# Patient Record
Sex: FEMALE | Race: WHITE | NOT HISPANIC OR LATINO | Employment: FULL TIME | URBAN - METROPOLITAN AREA
[De-identification: names, ages, dates, MRNs, and addresses within clinical notes are randomized per-mention and may not be internally consistent; named-entity substitution may affect disease eponyms.]

---

## 2017-02-07 PROBLEM — Z86.69 HISTORY OF EPILEPSY: Status: ACTIVE | Noted: 2017-02-07

## 2017-05-03 ENCOUNTER — HOSPITAL ENCOUNTER (OUTPATIENT)
Dept: LAB | Facility: MEDICAL CENTER | Age: 37
End: 2017-05-03
Attending: NURSE PRACTITIONER
Payer: MEDICAID

## 2017-05-03 ENCOUNTER — OFFICE VISIT (OUTPATIENT)
Dept: NEUROLOGY | Facility: MEDICAL CENTER | Age: 37
End: 2017-05-03
Payer: MEDICAID

## 2017-05-03 VITALS
WEIGHT: 156 LBS | OXYGEN SATURATION: 99 % | SYSTOLIC BLOOD PRESSURE: 122 MMHG | HEART RATE: 90 BPM | HEIGHT: 66 IN | BODY MASS INDEX: 25.07 KG/M2 | TEMPERATURE: 97.2 F | DIASTOLIC BLOOD PRESSURE: 64 MMHG

## 2017-05-03 DIAGNOSIS — G40.909 SEIZURE DISORDER (HCC): ICD-10-CM

## 2017-05-03 DIAGNOSIS — F41.9 ANXIETY: ICD-10-CM

## 2017-05-03 LAB
ALBUMIN SERPL BCP-MCNC: 4.6 G/DL (ref 3.2–4.9)
ALBUMIN/GLOB SERPL: 1.8 G/DL
ALP SERPL-CCNC: 48 U/L (ref 30–99)
ALT SERPL-CCNC: 15 U/L (ref 2–50)
ANION GAP SERPL CALC-SCNC: 6 MMOL/L (ref 0–11.9)
AST SERPL-CCNC: 16 U/L (ref 12–45)
BASOPHILS # BLD AUTO: 0.8 % (ref 0–1.8)
BASOPHILS # BLD: 0.05 K/UL (ref 0–0.12)
BILIRUB SERPL-MCNC: 0.5 MG/DL (ref 0.1–1.5)
BUN SERPL-MCNC: 10 MG/DL (ref 8–22)
CALCIUM SERPL-MCNC: 9.5 MG/DL (ref 8.5–10.5)
CHLORIDE SERPL-SCNC: 109 MMOL/L (ref 96–112)
CHOLEST SERPL-MCNC: 134 MG/DL (ref 100–199)
CO2 SERPL-SCNC: 25 MMOL/L (ref 20–33)
CREAT SERPL-MCNC: 0.67 MG/DL (ref 0.5–1.4)
EOSINOPHIL # BLD AUTO: 0.07 K/UL (ref 0–0.51)
EOSINOPHIL NFR BLD: 1.1 % (ref 0–6.9)
ERYTHROCYTE [DISTWIDTH] IN BLOOD BY AUTOMATED COUNT: 42.8 FL (ref 35.9–50)
GFR SERPL CREATININE-BSD FRML MDRD: >60 ML/MIN/1.73 M 2
GLOBULIN SER CALC-MCNC: 2.5 G/DL (ref 1.9–3.5)
GLUCOSE SERPL-MCNC: 96 MG/DL (ref 65–99)
HCT VFR BLD AUTO: 41.9 % (ref 37–47)
HDLC SERPL-MCNC: 46 MG/DL
HGB BLD-MCNC: 13.9 G/DL (ref 12–16)
IMM GRANULOCYTES # BLD AUTO: 0.02 K/UL (ref 0–0.11)
IMM GRANULOCYTES NFR BLD AUTO: 0.3 % (ref 0–0.9)
LDLC SERPL CALC-MCNC: 77 MG/DL
LYMPHOCYTES # BLD AUTO: 2.62 K/UL (ref 1–4.8)
LYMPHOCYTES NFR BLD: 42.7 % (ref 22–41)
MCH RBC QN AUTO: 31.5 PG (ref 27–33)
MCHC RBC AUTO-ENTMCNC: 33.2 G/DL (ref 33.6–35)
MCV RBC AUTO: 95 FL (ref 81.4–97.8)
MONOCYTES # BLD AUTO: 0.38 K/UL (ref 0–0.85)
MONOCYTES NFR BLD AUTO: 6.2 % (ref 0–13.4)
NEUTROPHILS # BLD AUTO: 3 K/UL (ref 2–7.15)
NEUTROPHILS NFR BLD: 48.9 % (ref 44–72)
NRBC # BLD AUTO: 0 K/UL
NRBC BLD AUTO-RTO: 0 /100 WBC
PLATELET # BLD AUTO: 182 K/UL (ref 164–446)
PMV BLD AUTO: 12 FL (ref 9–12.9)
POTASSIUM SERPL-SCNC: 3.9 MMOL/L (ref 3.6–5.5)
PROT SERPL-MCNC: 7.1 G/DL (ref 6–8.2)
RBC # BLD AUTO: 4.41 M/UL (ref 4.2–5.4)
SODIUM SERPL-SCNC: 140 MMOL/L (ref 135–145)
T4 FREE SERPL-MCNC: 1.01 NG/DL (ref 0.53–1.43)
TRIGL SERPL-MCNC: 56 MG/DL (ref 0–149)
WBC # BLD AUTO: 6.1 K/UL (ref 4.8–10.8)

## 2017-05-03 PROCEDURE — 84443 ASSAY THYROID STIM HORMONE: CPT

## 2017-05-03 PROCEDURE — 85025 COMPLETE CBC W/AUTO DIFF WBC: CPT

## 2017-05-03 PROCEDURE — 82306 VITAMIN D 25 HYDROXY: CPT

## 2017-05-03 PROCEDURE — 80061 LIPID PANEL: CPT

## 2017-05-03 PROCEDURE — 80053 COMPREHEN METABOLIC PANEL: CPT

## 2017-05-03 PROCEDURE — 82607 VITAMIN B-12: CPT

## 2017-05-03 PROCEDURE — 36415 COLL VENOUS BLD VENIPUNCTURE: CPT

## 2017-05-03 PROCEDURE — 84439 ASSAY OF FREE THYROXINE: CPT

## 2017-05-03 PROCEDURE — 99204 OFFICE O/P NEW MOD 45 MIN: CPT | Performed by: NURSE PRACTITIONER

## 2017-05-03 PROCEDURE — 80177 DRUG SCRN QUAN LEVETIRACETAM: CPT

## 2017-05-03 RX ORDER — LEVETIRACETAM 500 MG/1
1000 TABLET ORAL 2 TIMES DAILY
Qty: 120 TAB | Refills: 5 | Status: SHIPPED | OUTPATIENT
Start: 2017-05-03 | End: 2017-11-06 | Stop reason: SDUPTHER

## 2017-05-03 RX ORDER — LORAZEPAM 1 MG/1
TABLET ORAL
Qty: 20 TAB | Refills: 0 | Status: SHIPPED | OUTPATIENT
Start: 2017-05-03 | End: 2019-01-04

## 2017-05-03 RX ORDER — LEVETIRACETAM 500 MG/1
1000 TABLET ORAL 2 TIMES DAILY
COMMUNITY
End: 2017-05-03 | Stop reason: SDUPTHER

## 2017-05-03 ASSESSMENT — ENCOUNTER SYMPTOMS
DOUBLE VISION: 0
HEADACHES: 0
COUGH: 0
MUSCULOSKELETAL NEGATIVE: 1
VOMITING: 0
DIARRHEA: 0
SEIZURES: 0
ABDOMINAL PAIN: 0
SORE THROAT: 0
DEPRESSION: 0
DIZZINESS: 0
NAUSEA: 0
CONSTITUTIONAL NEGATIVE: 1
NERVOUS/ANXIOUS: 1

## 2017-05-03 NOTE — MR AVS SNAPSHOT
"Roxana Johnson   5/3/2017 8:00 AM   Office Visit   MRN: [id number]    Department:  [facility]   Dept Phone:  [phone]    Description:  Female : 1980   Provider:  ALYSSA Keating           Reason for Visit     New Patient epilepsy      Allergies as of 5/3/2017     Allergen Noted Reactions    Pcn [Penicillins] 2017         You were diagnosed with     Seizure disorder (CMS-[facility])   [436448]         Vital Signs     Blood Pressure Pulse Temperature Height Weight Body Mass Index    122/64 mmHg 90 36.2 °C (97.2 °F) 1.676 m (5' 6\") 70.761 kg (156 lb) 25.19 kg/m2    Oxygen Saturation Smoking Status                99% Current Every Day Smoker          Basic Information     Date Of Birth Sex Race Ethnicity Preferred Language    1980 Female White Non- English      Your appointments     2017 10:20 AM   Follow Up Visit with ALYSSA Keating   [facility] Neurology (--)    [address] [place] [address], [address]  [facility] [id number] [phone]           You will be receiving a confirmation call a few days before your appointment from our automated call confirmation system.              Problem List              ICD-10-CM Priority Class Noted - Resolved    History of epilepsy Z86.69   2017 - Present      Health Maintenance        Date Due Completion Dates    IMM DTaP/Tdap/Td Vaccine (1 - Tdap) 10/2/2017 (Originally 10/31/1999) ---    PAP SMEAR 2018 (Done)    Override on 2015: Done (history of HPV)            Current Immunizations     Influenza TIV (IM) 10/1/2016      Below and/or attached are the medications your provider expects you to take. Review all of your home medications and newly ordered medications with your provider and/or pharmacist. Follow medication instructions as directed by your provider and/or pharmacist. Please keep your medication list with you and share with your provider. Update the information when medications are " discontinued, doses are changed, or new medications (including over-the-counter products) are added; and carry medication information at all times in the event of emergency situations     Allergies:  PCN - (reactions not documented)               Medications  Valid as of: May 03, 2017 -  8:42 AM    Generic Name Brand Name Tablet Size Instructions for use    LevETIRAcetam (Tab) KEPPRA 500 MG Take 2 Tabs by mouth 2 times a day.        LORazepam (Tab) ATIVAN 1 MG Take 1/2-1 tablet PO PRN breakthrough seizures.  Do not exceed more than 2 tablets in 24 hours unless directed otherwise by physician.        Varenicline Tartrate (Misc) CHANTIX MARILOU 0.5 MG X 11 & 1 MG X 42 Use as directed.        Varenicline Tartrate (Tab) CHANTIX 1 MG Take 1 Tab by mouth 2 times a day.        .                 Medicines prescribed today were sent to:     [facility] [address] [place] [address] - [address] [id number] [facility] [address]    Phone: [phone] Fax: [phone]    Open 24 Hours?: No      Medication refill instructions:       If your prescription bottle indicates you have medication refills left, it is not necessary to call your provider’s office. Please contact your pharmacy and they will refill your medication.    If your prescription bottle indicates you do not have any refills left, you may request refills at any time through one of the following ways: The online [employer] system (except Urgent Care), by calling your provider’s office, or by asking your pharmacy to contact your provider’s office with a refill request. Medication refills are processed only during regular business hours and may not be available until the next business day. Your provider may request additional information or to have a follow-up visit with you prior to refilling your medication.   *Please Note: Medication refills are assigned a new Rx number when refilled electronically. Your pharmacy may indicate that no refills were authorized  even though a new prescription for the same medication is available at the pharmacy. Please request the medicine by name with the pharmacy before contacting your provider for a refill.        Your To Do List     Future Labs/Procedures Complete By Expires    CBC WITH DIFFERENTIAL  As directed 5/3/2018    COMP METABOLIC PANEL  As directed 5/4/2018    KEPPRA  As directed 5/4/2018    Comments:    Draw at least 8 hours after medication is taken.    VITAMIN D,25 HYDROXY  As directed 5/3/2018      Referral     A referral request has been sent to our patient care coordination department. Please allow 3-5 business days for us to process this request and contact you either by phone or mail. If you do not hear from us by the 5th business day, please call us at [phone].           [employer] Access Code: [email or web address]  Expires: 6/2/2017  7:44 AM    [employer]  A secure, online tool to manage your health information     [employer]® is a secure, online tool that connects you to your personalized health information from the privacy of your home -- day or night - making it very easy for you to manage your healthcare. Once the activation process is completed, you can even access your medical information using the [employer] rogelio, which is available for free in the Apple Rogelio store or Google Play store.     [employer] provides the following levels of access (as shown below):   My Chart Features   [facility] Doctor [facility]  Specialists [facility]  Urgent  Care Non-[facility]  Primary Care  Doctor   Email your healthcare team securely and privately 24/7 X X X    Manage appointments: schedule your next appointment; view details of past/upcoming appointments X      Request prescription refills. X      View recent personal medical records, including lab and immunizations X X X X   View health record, including health history, allergies, medications X X X X   Read reports about your outpatient visits, procedures, consult and ER  notes X X X X   See your discharge summary, which is a recap of your hospital and/or ER visit that includes your diagnosis, lab results, and care plan. X X       How to register for [employer]:  1. Go to  [email or web address].  2. Click on the Sign Up Now box, which takes you to the New Member Sign Up page. You will need to provide the following information:  a. Enter your [employer] Access Code exactly as it appears at the top of this page. (You will not need to use this code after you’ve completed the sign-up process. If you do not sign up before the expiration date, you must request a new code.)   b. Enter your date of birth.   c. Enter your home email address.   d. Click Submit, and follow the next screen’s instructions.  3. Create a Moonbasa ID. This will be your Moon[employer]a login ID and cannot be changed, so think of one that is secure and easy to remember.  4. Create a Moonbasa password. You can change your password at any time.  5. Enter your Password Reset Question and Answer. This can be used at a later time if you forget your password.   6. Enter your e-mail address. This allows you to receive e-mail notifications when new information is available in Moonbasa.  7. Click Sign Up. You can now view your health information.    For assistance activating your Moonbasa account, call [phone]        Quit Tobacco Information     Do you want to quit using tobacco?    Quitting tobacco decreases risks of cancer, heart and lung disease, increases life expectancy, improves sense of taste and smell, and increases spending money, among other benefits.    If you are thinking about quitting, we can help.  • [employer] Quit Tobacco Program: 957.396.7887  o Program occurs weekly for four weeks and includes pharmacist consultation on products to support quitting smoking or chewing tobacco. A provider referral is needed for pharmacist consultation.  • Tobacco Users Help Hotline: 5-303-QUIT-NOW (996-7128) or  https://[place].quitlogix.org/  o Free, confidential telephone and online coaching for [place] residents. Sessions are designed on a schedule that is convenient for you. Eligible clients receive free nicotine replacement therapy.  • Nationally: www.smokefree.gov  o Information and professional assistance to support both immediate and long-term needs as you become, and remain, a non-smoker. Smokefree.gov allows you to choose the help that best fits your needs.

## 2017-05-03 NOTE — PROGRESS NOTES
"Subjective:      Roxana Johnson is a 36 y.o. female who presents with New Patient for Seizure Disorder.        HPI  Has moved here from [place].     At the age of 16 she had an episode suspicious of a GTC seizure.  She then had a few more GTC seizures.  All seizures occurred at night.      Has tried and failed:  She was started on dilantin which she took for a few years.  Transitioned to Depakote and developed bleeding issues.  Also took Tegretol.  Stopped AED at age 19.    About 2 years ago, age 34, her father was ill with cancer and under a lot of stress.  Had spells of fast heart rate and diagnosed with an arrhythmia.  Evaluated by cardiology a few years ago and had an ECHO which was normal.  Found to have an irregular heart rate?      Then a few months later, she was having spells of euphoria and thought she was going \"crazy\".  Still noticing heart palpitations infrequently. Had a strong sense of feeling happy or a strong hot flash.  Evaluated by neurology-- had an EEG which was read as normal.  Started on Keppra which has been effective.  Feels confident with the current dose of LEV 1000mg BID.    Last known seizure was a few years ago.    Suspects that she might of had seizures when she was younger than age 16.    Medical history: normal and healthy.  Took anti-depressants as a teenager. \"Normal anxiety\".  Surgical history: none.    Education: 2 years of [employer].  Not currently working.  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    Brain MRI normal.    Needs clearance for DMV.    Smoker: Chantix prescribed but has not taken it.    Lives with her 2 children and both have special needs.  Did not take AED's during pregnancy.    Current Outpatient Prescriptions   Medication Sig Dispense Refill   • levetiracetam (KEPPRA) 500 MG Tab Take 1,000 mg by mouth 2 times a day.     • varenicline (CHANTIX STARTING MONTH MARILOU) 0.5 MG X 11 & 1 MG X 42 tablet Use as directed. 56 Tab 0   • varenicline (CHANTIX CONTINUING MONTH PAK) 1 " "MG tablet Take 1 Tab by mouth 2 times a day. 60 Tab 3     No current facility-administered medications for this visit.       Review of Systems   Constitutional: Negative.    HENT: Negative for hearing loss, nosebleeds and sore throat.         No recent head injury.   Eyes: Negative for double vision.        No new loss of vision.   Respiratory: Negative for cough.         No recent lung infections.   Cardiovascular: Negative for chest pain.   Gastrointestinal: Negative for nausea, vomiting, abdominal pain and diarrhea.   Genitourinary: Negative.    Musculoskeletal: Negative.    Skin: Negative.    Neurological: Negative for dizziness, seizures and headaches.   Endo/Heme/Allergies:        No history of endocrine dysfunction.  No new problems.   Psychiatric/Behavioral: Negative for depression. The patient is nervous/anxious.         No recent mood changes.          Objective:     /64 mmHg  Pulse 90  Temp(Src) 36.2 °C (97.2 °F)  Ht 1.676 m (5' 6\")  Wt 70.761 kg (156 lb)  BMI 25.19 kg/m2  SpO2 99%     Physical Exam   Constitutional: She is oriented to person, place, and time. She appears well-developed and well-nourished. No distress.   HENT:   Head: Normocephalic and atraumatic.   Nose: Nose normal.   Eyes: Pupils are equal, round, and reactive to light.   Wears glasses and contacts.   Neck: Normal range of motion.   Cardiovascular: Normal rate and regular rhythm.  Exam reveals no gallop and no friction rub.    No murmur heard.  Pulmonary/Chest: Effort normal and breath sounds normal. No respiratory distress.   Musculoskeletal: Normal range of motion.   Lymphadenopathy:     She has no cervical adenopathy.   Neurological: She is alert and oriented to person, place, and time. Gait normal.   Right-handed, pleasant, talkative.    CN II: Fundi normal, visual fields full to confrontation.  CN III, IV, VI: Pupils equal, round, and reactive to light.  Extraocular movements full and intact in horizontal and vertical " gaze.  CN V: Normal in motor and sensory modalities.  CN VII: No evidence of facial asymmetry.  CN VIII: Hearing grossly intact.  CN IX, X: Palate elevates symmetrically and in the midline.  CN XI: Normal sternocleidomastoid strength.  CN XII: Tongue is in the midline.    Motor: Normal muscle bulk and tone, with full and symmetric strength.  Sensory: Intact to light touch, pinprick, vibration, proprioception, and graphesthesia.  DTR's: 2+ throughout with flexor plantar responses.  Cerebellar/Coordination: Normal finger to finger, finger-tapping, rapid alternating movements, and foot tapping.  Gait: Normal casual gait.  Walks well on heels and toes, as well as in tandem gait.   Skin: Skin is warm and dry.   Psychiatric: She has a normal mood and affect.           Assessment/Plan:     Seizure Disorder:  History of seizures in adolescent years, only nocturnal in nature.  Tried and failed: Dilantin, Depakote, and Tegretol.    Recurrence of SPS consisting of a euphoric feeling, hot flash, maintenance of consciousness.    Neurological examination is normal and non-focal.    Continue LEV 1000mg BID.    Obtain labs including Keppra level.    Obtain REEG to evaluate for subclinical seizures.    Ativan 1mg tablet provided and discuss emergency scenarios of usage.    Discussed that I do not recommend she take Chantix for smoking cessation.  I would consider an anti-depressant first.    Will return after EEG primarily for DMV paperwork as she needs to obtain a [place] 's license.      EDUCATION AND COUNSELING:  -Education was provided to the patient and/or family regarding diagnosis and prognosis. The chronic and unpredictable nature of the condition was discussed. There is increased risk for additional events, which may carry potential for significant injuries and death.    -We reviewed the current antiepileptic regimen. Potential side effects of antiepileptics were discussed at length, including but no limited to:  hypersensitivity reactions (rash and others, some of which can be fatal), visual field changes (some of which may be irreversible), glaucoma, diplopia, kidney stones, osteopenia/osteoporosis/bone fractures, hyperthermia/anhydrosis, tremors, ataxia, dizziness, fatigue, increased risk for falls, cardiac arrhythmias/syncope, gastrointestinal (hepatitis, pancreatitis, gastritis, ulcers), gingival hypertrophy, drowsiness, sedation, anxiety/nervousness, increased risk for suicide, increased risk for depression, and psychosis. We reviewed drug-drug interactions and their potential effect on seizure control and medication side effects.    -Patient/family educated on SUDEP (Sudden Death in Epilepsy). Counseling was provided on the importance of strict medication and follow up compliance. The patient understands the risks associated with non-adherence with the medical plan as outlined, including but not limited to an increased risk for breakthrough seizures, which may contribute to injuries, disability, status epilepticus, and even death.    -Counseling was also provided on potential effects of alcohol and other drugs, which may lower seizure threshold and/or affect the metabolism of antiepileptic drugs. I recommend avoidance of alcohol and illegal drugs.  -Recommend proper hydration, regular exercise, proper sleep hygiene (7-8 hrs of overnight sleep, avoid sleep deprivation).    -I have made the patient aware of mandatory reporting required by the law in the State of [place] regarding episodes of seizures, loss of consciousness, and/or alteration of awareness. The patient and family are responsible for reporting events to the DMV, instructions were provided. The patient verbalized understanding and states she has been driving. Other seizure precautions were discussed at length, including no diving, no skydiving, no unsupervised swimming, no Jacuzzi or bathing in bathtubs.    Pt agrees with plan.

## 2017-05-04 LAB
25(OH)D3 SERPL-MCNC: 29 NG/ML (ref 30–100)
LEVETIRACETAM SERPL-MCNC: 21 UG/ML (ref 12–46)
TSH SERPL DL<=0.005 MIU/L-ACNC: 1.34 UIU/ML (ref 0.3–3.7)
VIT B12 SERPL-MCNC: 471 PG/ML (ref 211–911)

## 2017-05-09 ENCOUNTER — NON-PROVIDER VISIT (OUTPATIENT)
Dept: NEUROLOGY | Facility: MEDICAL CENTER | Age: 37
End: 2017-05-09
Payer: MEDICAID

## 2017-05-09 DIAGNOSIS — G40.909 SEIZURE DISORDER (HCC): ICD-10-CM

## 2017-05-09 PROCEDURE — 95816 EEG AWAKE AND DROWSY: CPT | Performed by: PSYCHIATRY & NEUROLOGY

## 2017-05-09 NOTE — PROGRESS NOTES
VIDEO ELECTROENCEPHALOGRAM REPORT      Referring provider: ALVARO Downey.    DOS:  5/9/2017 (total recording for 27 minutes).     INDICATION:  Roxana Johnson 36 y.o. female presenting with seizures.     CURRENT ANTIEPILEPTIC REGIMEN: Levetiracetam 1000 mg po bid.     TECHNIQUE: 30 channel video electroencephalogram (EEG) was performed in accordance with the international 10-20 system. The study was reviewed in bipolar and referential montages. The recording examined the patient during wakeful only state.     DESCRIPTION OF THE RECORD:  During the wakefulness, the background showed a symmetrical 12 Hz alpha activity posteriorly with amplitude of 70 mV.  There was reactivity to eye closure/opening.  A normal anterior-posterior gradient was noted with faster beta frequencies seen anteriorly.      ACTIVATION PROCEDURES:   Hyperventilation was performed by the patient for a total of 3 minutes. The technician performing the test noted good effort. No significant background changes were noted.     Intermittent Photic stimulation was performed in a stepwise fashion from 1 to 30 Hz and elicited a normal response (photic driving), most noticeable in the posterior leads.      ICTAL AND/OR INTERICTAL FINDINGS:   No focal or generalized epileptiform activity noted. No regional slowing was seen during this routine study.  No clinical events or seizures were reported or recorded during the study.     EKG: sampling of the EKG recording demonstrated sinus rhythm.       INTERPRETATION:  This is a normal video EEG recording in the awake only state.  Clinical correlation is recommended.    Note: A normal EEG does not rule out epilepsy.  If the clinical suspicion remains high for seizures, a prolonged recording to capture clinical or subclinical events may be helpful.        Juan Marx MD  Medical Director, Epilepsy and Neurodiagnostics.   Clinical  of Neurology [facility]  Medicine.   Diplomate in Neurology, Epilepsy, and Electrodiagnostic Medicine.   Office: [phone]  Fax: [phone]

## 2017-05-09 NOTE — MR AVS SNAPSHOT
Roxana Belcher Alex   2017 9:00 AM   Non-Provider Visit   MRN: [id number]    Department:  [facility]   Dept Phone:  [phone]    Description:  Female : 1980   Provider:  NEURODIAGNOSTIC LAB [facility]           Allergies as of 2017     Allergen Noted Reactions    Pcn [Penicillins] 2017         You were diagnosed with     Seizure disorder (CMS-[facility])   [927432]         Vital Signs     Smoking Status                   Current Every Day Smoker           Basic Information     Date Of Birth Sex Race Ethnicity Preferred Language    1980 Female White Non- English      Your appointments     May 10, 2017 11:00 AM   MFM 45 Minute with [facility] (--)    [address] [place] [address], [address]  [facility] [id number] [phone]            2017 10:20 AM   Follow Up Visit with ALYSSA Keating   [facility] Neurology (--)    [address] [place] [address], [address]  [facility] [address] [phone]           You will be receiving a confirmation call a few days before your appointment from our automated call confirmation system.              Problem List              ICD-10-CM Priority Class Noted - Resolved    History of epilepsy Z86.69   2017 - Present      Health Maintenance        Date Due Completion Dates    IMM DTaP/Tdap/Td Vaccine (1 - Tdap) 10/2/2017 (Originally 10/31/1999) ---    PAP SMEAR 2018 (Done)    Override on 2015: Done (history of HPV)            Results     EEG DICTATED RESULTS                   Current Immunizations     Influenza TIV (IM) 10/1/2016      Below and/or attached are the medications your provider expects you to take. Review all of your home medications and newly ordered medications with your provider and/or pharmacist. Follow medication instructions as directed by your provider and/or pharmacist. Please keep your medication list with you and share with your provider. Update  the information when medications are discontinued, doses are changed, or new medications (including over-the-counter products) are added; and carry medication information at all times in the event of emergency situations     Allergies:  PCN - (reactions not documented)               Medications  Valid as of: May 09, 2017 -  5:09 PM    Generic Name Brand Name Tablet Size Instructions for use    LevETIRAcetam (Tab) KEPPRA 500 MG Take 2 Tabs by mouth 2 times a day.        LORazepam (Tab) ATIVAN 1 MG Take 1/2-1 tablet PO PRN breakthrough seizures.  Do not exceed more than 2 tablets in 24 hours unless directed otherwise by physician.        Varenicline Tartrate (Misc) CHANTIX MARILOU 0.5 MG X 11 & 1 MG X 42 Use as directed.        Varenicline Tartrate (Tab) CHANTIX 1 MG Take 1 Tab by mouth 2 times a day.        .                 Medicines prescribed today were sent to:     [facility] [address] [place] [address] [address] [address] [facility] [address]    Phone: [phone] Fax: [phone]    Open 24 Hours?: No      Medication refill instructions:       If your prescription bottle indicates you have medication refills left, it is not necessary to call your provider’s office. Please contact your pharmacy and they will refill your medication.    If your prescription bottle indicates you do not have any refills left, you may request refills at any time through one of the following ways: The online [employer] system (except Urgent Care), by calling your provider’s office, or by asking your pharmacy to contact your provider’s office with a refill request. Medication refills are processed only during regular business hours and may not be available until the next business day. Your provider may request additional information or to have a follow-up visit with you prior to refilling your medication.   *Please Note: Medication refills are assigned a new Rx number when refilled electronically. Your pharmacy may  indicate that no refills were authorized even though a new prescription for the same medication is available at the pharmacy. Please request the medicine by name with the pharmacy before contacting your provider for a refill.           MyChart Access Code: Activation code not generated  Current MyChart Status: Active          Quit Tobacco Information     Do you want to quit using tobacco?    Quitting tobacco decreases risks of cancer, heart and lung disease, increases life expectancy, improves sense of taste and smell, and increases spending money, among other benefits.    If you are thinking about quitting, we can help.  • [facility] Quit Tobacco Program: 652.494.2962  o Program occurs weekly for four weeks and includes pharmacist consultation on products to support quitting smoking or chewing tobacco. A provider referral is needed for pharmacist consultation.  • Tobacco Users Help Hotline: 4-422-QUITNOW (429-5735) or https://[place].quitlogix.org/  o Free, confidential telephone and online coaching for [place] residents. Sessions are designed on a schedule that is convenient for you. Eligible clients receive free nicotine replacement therapy.  • Nationally: www.smokefree.gov  o Information and professional assistance to support both immediate and long-term needs as you become, and remain, a non-smoker. Smokefree.gov allows you to choose the help that best fits your needs.

## 2017-05-19 ENCOUNTER — TELEPHONE (OUTPATIENT)
Dept: NEUROLOGY | Facility: MEDICAL CENTER | Age: 37
End: 2017-05-19

## 2017-05-19 NOTE — TELEPHONE ENCOUNTER
Spoke with patient today, she is wondering if you could start her on an anxiety medication, PRN. She is feeling like she would benefit from having something available to her. If you need to see her prior to do so, patient is more than willing to make an appointment.  Please Advise,    Callyoselyn Schmidt  Phone # [phone]   Okay to leave message

## 2017-05-30 ENCOUNTER — OFFICE VISIT (OUTPATIENT)
Dept: NEUROLOGY | Facility: MEDICAL CENTER | Age: 37
End: 2017-05-30
Payer: MEDICAID

## 2017-05-30 ENCOUNTER — APPOINTMENT (OUTPATIENT)
Dept: NEUROLOGY | Facility: MEDICAL CENTER | Age: 37
End: 2017-05-30
Payer: MEDICAID

## 2017-05-30 DIAGNOSIS — Z86.69 HISTORY OF EPILEPSY: ICD-10-CM

## 2017-05-30 PROCEDURE — 99213 OFFICE O/P EST LOW 20 MIN: CPT | Performed by: PHYSICIAN ASSISTANT

## 2017-05-30 NOTE — MR AVS SNAPSHOT
Roxana Johnson   2017 10:20 AM   Office Visit   MRN: [id number]    Department:  [facility]   Dept Phone:  [phone]    Description:  Female : 1980   Provider:  Maribeth Hobbs PA-C           Reason for Visit     Seizure DMV paper work      Allergies as of 2017     Allergen Noted Reactions    Pcn [Penicillins] 2017         You were diagnosed with     History of epilepsy   [[id number]]         Vital Signs     Last Menstrual Period Smoking Status                2017 Current Every Day Smoker          Basic Information     Date Of Birth Sex Race Ethnicity Preferred Language    1980 Female White Non- English      Your appointments     2017 10:20 AM   Follow Up Visit with ALYSSA Keating   [facility] (--)    [address], [address]  [facility] [id number] [phone]           You will be receiving a confirmation call a few days before your appointment from our automated call confirmation system.              Problem List              ICD-10-CM Priority Class Noted - Resolved    History of epilepsy Z86.69   2017 - Present      Health Maintenance        Date Due Completion Dates    IMM DTaP/Tdap/Td Vaccine (1 - Tdap) 10/2/2017 (Originally 10/31/1999) ---    PAP SMEAR 5/10/2020 5/10/2017, 2015 (Done)    Override on 2015: Done (history of HPV)            Current Immunizations     Influenza TIV (IM) 10/1/2016      Below and/or attached are the medications your provider expects you to take. Review all of your home medications and newly ordered medications with your provider and/or pharmacist. Follow medication instructions as directed by your provider and/or pharmacist. Please keep your medication list with you and share with your provider. Update the information when medications are discontinued, doses are changed, or new medications (including over-the-counter products) are added; and carry medication  information at all times in the event of emergency situations     Allergies:  PCN - (reactions not documented)               Medications  Valid as of: May 30, 2017 - 11:16 AM    Generic Name Brand Name Tablet Size Instructions for use    LevETIRAcetam (Tab) KEPPRA 500 MG Take 2 Tabs by mouth 2 times a day.        LORazepam (Tab) ATIVAN 1 MG Take 1/2-1 tablet PO PRN breakthrough seizures.  Do not exceed more than 2 tablets in 24 hours unless directed otherwise by physician.        Varenicline Tartrate (Misc) CHANTIX MARILOU 0.5 MG X 11 & 1 MG X 42 Use as directed.        Varenicline Tartrate (Tab) CHANTIX 1 MG Take 1 Tab by mouth 2 times a day.        .                 Medicines prescribed today were sent to:     [facility] [address] [place] [address] - [facility] [id number] [facility] [address]    Phone: [phone] Fax: [phone]    Open 24 Hours?: No      Medication refill instructions:       If your prescription bottle indicates you have medication refills left, it is not necessary to call your provider’s office. Please contact your pharmacy and they will refill your medication.    If your prescription bottle indicates you do not have any refills left, you may request refills at any time through one of the following ways: The online [employer] system (except Urgent Care), by calling your provider’s office, or by asking your pharmacy to contact your provider’s office with a refill request. Medication refills are processed only during regular business hours and may not be available until the next business day. Your provider may request additional information or to have a follow-up visit with you prior to refilling your medication.   *Please Note: Medication refills are assigned a new Rx number when refilled electronically. Your pharmacy may indicate that no refills were authorized even though a new prescription for the same medication is available at the pharmacy. Please request the medicine by name  with the pharmacy before contacting your provider for a refill.           MyChart Access Code: Activation code not generated  Current MyChart Status: Active          Quit Tobacco Information     Do you want to quit using tobacco?    Quitting tobacco decreases risks of cancer, heart and lung disease, increases life expectancy, improves sense of taste and smell, and increases spending money, among other benefits.    If you are thinking about quitting, we can help.  • [facility] Quit Tobacco Program: 138.849.2538  o Program occurs weekly for four weeks and includes pharmacist consultation on products to support quitting smoking or chewing tobacco. A provider referral is needed for pharmacist consultation.  • Tobacco Users Help Hotline: 1-600-QUITNOW (585-1476) or https://[place].quitlogix.org/  o Free, confidential telephone and online coaching for [place] residents. Sessions are designed on a schedule that is convenient for you. Eligible clients receive free nicotine replacement therapy.  • Nationally: www.smokefree.gov  o Information and professional assistance to support both immediate and long-term needs as you become, and remain, a non-smoker. Smokefree.gov allows you to choose the help that best fits your needs.

## 2017-05-30 NOTE — PROGRESS NOTES
Cc:  - seizures    Established patient of Galina Estrada  who suffers from seizures  Here today for completion of paperwork for FMLA/disability/related to their condition.    They report to me that they have not had a seizure with LOC for years. They are compliant with medication and review of record shows most recent EEG was normal.  They are compliant with follow up in our office    Review of the medical chart and interviewing patient, I completed paperwork and it is scanned into media.     RTC as previously scheduled.    Total time with this visit: 15    Minutes face-to-face with patient. More than 50% of this visit was spent reviewing medical chart and speaking with the patient about their condition and how it affects their ability to do their job.

## 2017-06-07 ENCOUNTER — TELEPHONE (OUTPATIENT)
Dept: NEUROLOGY | Facility: MEDICAL CENTER | Age: 37
End: 2017-06-07

## 2017-06-07 NOTE — TELEPHONE ENCOUNTER
Patient left this Britely message.    Hello. My eeg results say something like no component found? Does this mean a normal  test ?  I went over all of my blood work with Jessica De La Torre. Overall everything was ok. I am taking vitamin D now for insufficiency that showed up in the blood work. At least summer will be here soon and bring me some sushine,! What else do I need to do to get medical paperwork sent over to be reviewed by the DMV?   It was flory to meet you! Thank you!

## 2017-06-07 NOTE — TELEPHONE ENCOUNTER
She was just here 5/30 with Maribeth and she mentions in her note that paperwork for DMV was completed.  Please ask Maribeth or patient what needs to be clarified.

## 2017-06-07 NOTE — TELEPHONE ENCOUNTER
Spoke with patient, everything has been clarified and patient actually got her 's License Monday!

## 2017-11-06 ENCOUNTER — TELEPHONE (OUTPATIENT)
Dept: NEUROLOGY | Facility: MEDICAL CENTER | Age: 37
End: 2017-11-06

## 2017-11-06 ENCOUNTER — OFFICE VISIT (OUTPATIENT)
Dept: NEUROLOGY | Facility: MEDICAL CENTER | Age: 37
End: 2017-11-06
Payer: MEDICAID

## 2017-11-06 VITALS
WEIGHT: 152.4 LBS | SYSTOLIC BLOOD PRESSURE: 122 MMHG | BODY MASS INDEX: 24.49 KG/M2 | OXYGEN SATURATION: 98 % | TEMPERATURE: 97.8 F | HEART RATE: 62 BPM | HEIGHT: 66 IN | DIASTOLIC BLOOD PRESSURE: 80 MMHG | RESPIRATION RATE: 16 BRPM

## 2017-11-06 DIAGNOSIS — G40.909 SEIZURE DISORDER (HCC): ICD-10-CM

## 2017-11-06 PROCEDURE — 99213 OFFICE O/P EST LOW 20 MIN: CPT | Performed by: NURSE PRACTITIONER

## 2017-11-06 RX ORDER — LEVETIRACETAM 500 MG/1
1000 TABLET ORAL 2 TIMES DAILY
Qty: 120 TAB | Refills: 11 | Status: SHIPPED | OUTPATIENT
Start: 2017-11-06 | End: 2021-06-10 | Stop reason: SDUPTHER

## 2017-11-06 RX ORDER — M-VIT,TX,IRON,MINS/CALC/FOLIC 27MG-0.4MG
1 TABLET ORAL DAILY
COMMUNITY
End: 2018-05-08

## 2017-11-06 ASSESSMENT — ENCOUNTER SYMPTOMS
DIARRHEA: 0
SORE THROAT: 0
MUSCULOSKELETAL NEGATIVE: 1
NERVOUS/ANXIOUS: 0
ABDOMINAL PAIN: 0
DIZZINESS: 0
NAUSEA: 0
COUGH: 0
SEIZURES: 0
CONSTITUTIONAL NEGATIVE: 1
DEPRESSION: 0
DOUBLE VISION: 0
VOMITING: 0
HEADACHES: 0

## 2017-11-06 NOTE — TELEPHONE ENCOUNTER
She will be needing an appt closer to the end of May for her DMV form completion.  It was completed on 5/30/17 this year.

## 2017-11-06 NOTE — PROGRESS NOTES
"Subjective:      Roxana Johnson is a 37 y.o. female who presents with Follow-Up (Seizure disorder (CMS-[facility]))          HPI   She \"always wonders what happens when she sleeps\".  For no particular concern.  Has not noticed an aura, a sense of euphoria.    She was trialed on buspar, only took it for 2 nights.  She felt like she was going to \"black out\" on the medication. Horrible GI upset.    Does have a therapist.  Deals with anxiety and sleeplessness.    Current Outpatient Prescriptions   Medication Sig Dispense Refill   • therapeutic multivitamin-minerals (THERAGRAN-M) Tab Take 1 Tab by mouth every day.     • Cholecalciferol (VITAMIN D3 PO) Take  by mouth.     • Calcium-Magnesium-Vitamin D (CALCIUM 500 PO) Take  by mouth.     • levetiracetam (KEPPRA) 500 MG Tab Take 2 Tabs by mouth 2 times a day. 120 Tab 5   • lorazepam (ATIVAN) 1 MG Tab Take 1/2-1 tablet PO PRN breakthrough seizures.  Do not exceed more than 2 tablets in 24 hours unless directed otherwise by physician. 20 Tab 0     No current facility-administered medications for this visit.          Review of Systems   Constitutional: Negative.    HENT: Negative for hearing loss, nosebleeds and sore throat.         No recent head injury.   Eyes: Negative for double vision.        No new loss of vision.   Respiratory: Negative for cough.         No recent lung infections.   Cardiovascular: Negative for chest pain.   Gastrointestinal: Negative for abdominal pain, diarrhea, nausea and vomiting.   Genitourinary: Negative.    Musculoskeletal: Negative.    Skin: Negative.    Neurological: Negative for dizziness, seizures and headaches.   Endo/Heme/Allergies:        No history of endocrine dysfunction.  No new problems.   Psychiatric/Behavioral: Negative for depression. The patient is not nervous/anxious.         No recent mood changes.          Objective:     /80   Pulse 62   Temp 36.6 °C (97.8 °F)   Resp 16   Ht 1.676 m (5' 6\")   Wt 69.1 kg (152 lb 6.4 " oz)   SpO2 98%   BMI 24.60 kg/m²      Physical Exam   Constitutional: She is oriented to person, place, and time. She appears well-developed and well-nourished. No distress.   HENT:   Head: Normocephalic and atraumatic.   Eyes: Pupils are equal, round, and reactive to light.   Neck: Normal range of motion.   Cardiovascular: Normal rate and regular rhythm.    Pulmonary/Chest: Effort normal and breath sounds normal. No respiratory distress.   Musculoskeletal: Normal range of motion.   Neurological: She is alert and oriented to person, place, and time. She has normal strength and normal reflexes. No cranial nerve deficit. She exhibits normal muscle tone. Coordination normal.   No observable changes in neurologic status.  See initial new patient examination for details.           Skin: Skin is warm and dry.           Assessment/Plan:     Seizure Disorder:  History of seizures in adolescent years, only nocturnal in nature.  Tried and failed: Dilantin, Depakote, and Tegretol.     Seizure type:SPS consisting of a euphoric feeling, hot flash, maintenance of consciousness.     Continue LEV 1000mg BID.    Ativan 1mg tablet provided and discuss emergency scenarios of usage.     Discussed that I do not recommend she take Chantix for smoking cessation.  I would consider an anti-depressant first.     Will need renewal of DMV form the end of May 2018.        EDUCATION AND COUNSELING:  -Education was provided to the patient and/or family regarding diagnosis and prognosis. The chronic and unpredictable nature of the condition was discussed. There is increased risk for additional events, which may carry potential for significant injuries and death.    -We reviewed the current antiepileptic regimen. Potential side effects of antiepileptics were discussed at length, including but no limited to: hypersensitivity reactions (rash and others, some of which can be fatal), visual field changes (some of which may be irreversible), glaucoma,  diplopia, kidney stones, osteopenia/osteoporosis/bone fractures, hyperthermia/anhydrosis, tremors, ataxia, dizziness, fatigue, increased risk for falls, cardiac arrhythmias/syncope, gastrointestinal (hepatitis, pancreatitis, gastritis, ulcers), gingival hypertrophy, drowsiness, sedation, anxiety/nervousness, increased risk for suicide, increased risk for depression, and psychosis. We reviewed drug-drug interactions and their potential effect on seizure control and medication side effects.    -Patient/family educated on SUDEP (Sudden Death in Epilepsy). Counseling was provided on the importance of strict medication and follow up compliance. The patient understands the risks associated with non-adherence with the medical plan as outlined, including but not limited to an increased risk for breakthrough seizures, which may contribute to injuries, disability, status epilepticus, and even death.    -Counseling was also provided on potential effects of alcohol and other drugs, which may lower seizure threshold and/or affect the metabolism of antiepileptic drugs. I recommend avoidance of alcohol and illegal drugs.  -Recommend proper hydration, regular exercise, proper sleep hygiene (7-8 hrs of overnight sleep, avoid sleep deprivation).    -I have made the patient aware of mandatory reporting required by the law in the [place] of [place] regarding episodes of seizures, loss of consciousness, and/or alteration of awareness. The patient and family are responsible for reporting events to the DMV, instructions were provided. The patient verbalized understanding and states she has been driving. Other seizure precautions were discussed at length, including no diving, no skydiving, no unsupervised swimming, no Jacuzzi or bathing in bathtubs.    Pt agrees with plan.

## 2018-05-08 ENCOUNTER — OFFICE VISIT (OUTPATIENT)
Dept: NEUROLOGY | Facility: MEDICAL CENTER | Age: 38
End: 2018-05-08
Payer: MEDICAID

## 2018-05-08 VITALS
DIASTOLIC BLOOD PRESSURE: 76 MMHG | TEMPERATURE: 98.2 F | BODY MASS INDEX: 26.29 KG/M2 | HEIGHT: 66 IN | OXYGEN SATURATION: 95 % | RESPIRATION RATE: 16 BRPM | HEART RATE: 80 BPM | WEIGHT: 163.58 LBS | SYSTOLIC BLOOD PRESSURE: 120 MMHG

## 2018-05-08 DIAGNOSIS — Z86.69 HISTORY OF EPILEPSY: ICD-10-CM

## 2018-05-08 DIAGNOSIS — R42 VERTIGO: ICD-10-CM

## 2018-05-08 PROCEDURE — 99213 OFFICE O/P EST LOW 20 MIN: CPT | Performed by: NURSE PRACTITIONER

## 2018-05-08 ASSESSMENT — ENCOUNTER SYMPTOMS
HEADACHES: 0
COUGH: 0
VOMITING: 0
DEPRESSION: 0
ABDOMINAL PAIN: 0
NERVOUS/ANXIOUS: 0
DIARRHEA: 0
CONSTITUTIONAL NEGATIVE: 1
MUSCULOSKELETAL NEGATIVE: 1
SORE THROAT: 0
SEIZURES: 0
DOUBLE VISION: 0
NAUSEA: 0

## 2018-05-08 ASSESSMENT — PATIENT HEALTH QUESTIONNAIRE - PHQ9: CLINICAL INTERPRETATION OF PHQ2 SCORE: 0

## 2018-05-08 NOTE — PROGRESS NOTES
"Subjective:      Roxana Johnson is a 37 y.o. female who presents with Follow-Up (Seizure disorder/ DMV paperwork)          HPI She \"always wonders what happens when she sleeps\".  For no particular concern.    Has not noticed an aura, a sense of euphoria.  She is very pleased with the management and control of her seizures.     Does have a therapist.  Deals with anxiety and sleeplessness.    Tends to have a sense of vertigo when in the elevators.  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    Tends to be motion sensitive.      Current Outpatient Prescriptions   Medication Sig Dispense Refill   • levetiracetam (KEPPRA) 500 MG Tab Take 2 Tabs by mouth 2 times a day. 120 Tab 11   • therapeutic multivitamin-minerals (THERAGRAN-M) Tab Take 1 Tab by mouth every day.     • Cholecalciferol (VITAMIN D3 PO) Take  by mouth.     • Calcium-Magnesium-Vitamin D (CALCIUM 500 PO) Take  by mouth.     • lorazepam (ATIVAN) 1 MG Tab Take 1/2-1 tablet PO PRN breakthrough seizures.  Do not exceed more than 2 tablets in 24 hours unless directed otherwise by physician. 20 Tab 0     No current facility-administered medications for this visit.          Review of Systems   Constitutional: Negative.    HENT: Negative for hearing loss, nosebleeds and sore throat.         No recent head injury.   Eyes: Negative for double vision.        No new loss of vision.   Respiratory: Negative for cough.         No recent lung infections.   Cardiovascular: Negative for chest pain.   Gastrointestinal: Negative for abdominal pain, diarrhea, nausea and vomiting.   Genitourinary: Negative.    Musculoskeletal: Negative.    Skin: Negative.    Neurological: Negative for seizures and headaches.   Endo/Heme/Allergies:        No history of endocrine dysfunction.  No new problems.   Psychiatric/Behavioral: Negative for depression. The patient is not nervous/anxious.         No recent mood changes.          Objective:     BP " "120/76   Pulse 80   Temp 36.8 °C (98.2 °F)   Resp 16   Ht 1.676 m (5' 6\")   Wt 74.2 kg (163 lb 9.3 oz)   SpO2 95%   BMI 26.40 kg/m²      Physical Exam   Constitutional: She is oriented to person, place, and time. She appears well-developed and well-nourished.   HENT:   Head: Normocephalic and atraumatic.   Eyes: EOM are normal.   Neck: Normal range of motion.   Cardiovascular: Normal rate and regular rhythm.    Pulmonary/Chest: Effort normal.   Neurological: She is alert and oriented to person, place, and time. She exhibits normal muscle tone. Gait normal.   No observable changes in neurologic status.  See initial new patient examination for details.    Skin: Skin is warm.   Psychiatric: She has a normal mood and affect.             Assessment/Plan:     Seizure Disorder:  History of seizures in adolescent years, only nocturnal in nature.  Tried and failed: Dilantin, Depakote, and Tegretol.     Seizure type:SPS consisting of a euphoric feeling, hot flash, maintenance of consciousness.     Continue LEV 1000mg BID.    Discussed motion sickness/vertigo with riding the elevators.  Recommend OTC options and to avoid the elevators.  Also provided information regarding the Epley's maneuver.     Ativan 1mg tablet provided and discuss emergency scenarios of usage.     DMV form completed.    Return for follow-up in 6 months.     EDUCATION AND COUNSELING:  -Education was provided to the patient and/or family regarding diagnosis and prognosis. The chronic and unpredictable nature of the condition was discussed. There is increased risk for additional events, which may carry potential for significant injuries and death.    -We reviewed the current antiepileptic regimen. Potential side effects of antiepileptics were discussed at length, including but no limited to: hypersensitivity reactions (rash and others, some of which can be fatal), visual field changes (some of which may be irreversible), glaucoma, diplopia, kidney " stones, osteopenia/osteoporosis/bone fractures, hyperthermia/anhydrosis, tremors, ataxia, dizziness, fatigue, increased risk for falls, cardiac arrhythmias/syncope, gastrointestinal (hepatitis, pancreatitis, gastritis, ulcers), gingival hypertrophy, drowsiness, sedation, anxiety/nervousness, increased risk for suicide, increased risk for depression, and psychosis. We reviewed drug-drug interactions and their potential effect on seizure control and medication side effects.    -Patient/family educated on SUDEP (Sudden Death in Epilepsy). Counseling was provided on the importance of strict medication and follow up compliance. The patient understands the risks associated with non-adherence with the medical plan as outlined, including but not limited to an increased risk for breakthrough seizures, which may contribute to injuries, disability, status epilepticus, and even death.    -Counseling was also provided on potential effects of alcohol and other drugs, which may lower seizure threshold and/or affect the metabolism of antiepileptic drugs. I recommend avoidance of alcohol and illegal drugs.  -Recommend proper hydration, regular exercise, proper sleep hygiene (7-8 hrs of overnight sleep, avoid sleep deprivation).    -I have made the patient aware of mandatory reporting required by the law in the [place] regarding episodes of seizures, loss of consciousness, and/or alteration of awareness. The patient and family are responsible for reporting events to the DMV, instructions were provided. The patient verbalized understanding and states she has been driving. Other seizure precautions were discussed at length, including no diving, no skydiving, no unsupervised swimming, no Jacuzzi or bathing in bathtubs.    Pt agrees with plan.

## 2018-09-26 PROBLEM — Z97.5 IUD (INTRAUTERINE DEVICE) IN PLACE: Status: ACTIVE | Noted: 2018-09-26

## 2019-01-04 PROBLEM — M54.50 ACUTE LOW BACK PAIN: Status: ACTIVE | Noted: 2019-01-04

## 2019-01-04 PROBLEM — R31.9 HEMATURIA: Status: ACTIVE | Noted: 2019-01-04

## 2019-01-04 PROBLEM — R30.0 DYSURIA: Status: ACTIVE | Noted: 2019-01-04

## 2019-01-04 PROBLEM — Z13.29 SCREENING FOR THYROID DISORDER: Status: ACTIVE | Noted: 2019-01-04

## 2019-01-04 PROBLEM — R35.0 URINARY FREQUENCY: Status: ACTIVE | Noted: 2019-01-04

## 2019-01-04 PROBLEM — R50.9 FEVER: Status: ACTIVE | Noted: 2019-01-04

## 2019-01-04 PROBLEM — M62.89 PELVIC FLOOR DYSFUNCTION IN FEMALE: Status: ACTIVE | Noted: 2019-01-04

## 2019-01-04 PROBLEM — R11.0 NAUSEATED: Status: ACTIVE | Noted: 2019-01-04

## 2019-01-04 PROBLEM — R82.90 ABNORMAL URINE ODOR: Status: ACTIVE | Noted: 2019-01-04

## 2019-01-14 ENCOUNTER — HOSPITAL ENCOUNTER (OUTPATIENT)
Dept: LAB | Facility: MEDICAL CENTER | Age: 39
End: 2019-01-14
Attending: SPECIALIST
Payer: MEDICAID

## 2019-01-14 PROCEDURE — 87491 CHLMYD TRACH DNA AMP PROBE: CPT

## 2019-01-14 PROCEDURE — 87624 HPV HI-RISK TYP POOLED RSLT: CPT

## 2019-01-14 PROCEDURE — 88305 TISSUE EXAM BY PATHOLOGIST: CPT

## 2019-01-14 PROCEDURE — 88175 CYTOPATH C/V AUTO FLUID REDO: CPT

## 2019-01-14 PROCEDURE — 87591 N.GONORRHOEAE DNA AMP PROB: CPT

## 2019-01-16 LAB
AMBIGUOUS DTTM AMBI4: NORMAL
PATHOLOGY CONSULT NOTE: NORMAL

## 2019-01-17 LAB — AMBIGUOUS DTTM AMBI4: NORMAL

## 2019-01-18 LAB
C TRACH DNA GENITAL QL NAA+PROBE: NEGATIVE
CYTOLOGY REG CYTOL: NORMAL
HPV HR 12 DNA CVX QL NAA+PROBE: NEGATIVE
HPV16 DNA SPEC QL NAA+PROBE: NEGATIVE
HPV18 DNA SPEC QL NAA+PROBE: NEGATIVE
N GONORRHOEA DNA GENITAL QL NAA+PROBE: NEGATIVE
SPECIMEN SOURCE: NORMAL
SPECIMEN SOURCE: NORMAL

## 2019-02-13 NOTE — H&P
DATE OF ADMISSION:  2019    REASON FOR SURGERY:  Near complete uterovaginal prolapse, pelvic pain,   dyspareunia, mixed urinary incontinence with a strong type 2 stress urinary   incontinence component as demonstrated and confirmed on urodynamic studies.    HISTORY OF PRESENT ILLNESS:  This is a 38-year-old woman  3, para 2,   therapeutic  1, with a negative urine pregnancy test on 2019,   who states that she has exhausted all conservative measures, had been referred   from her primary care provider for evaluation and management of her near   complete uterovaginal prolapse, for which she has tried conservative   management with pelvic floor exercise therapy, declines pessary therapy and is   now wishing to proceed forward with attempted definitive surgical correction   with a laparoscopic-assisted vaginal hysterectomy, bilateral salpingectomy,   and she is wishing to proceed forward with ovarian conservation, anterior and   posterior vaginal repair, enterocele repair, perineoplasty, sacrospinous vault   suspension, transobturator tape midurethral sling.  These procedures were   addressed with the patient in detail over several visits.  She has asked   appropriate questions, signed the appropriate consents and wishes to proceed   forward with scheduled surgery as planned on 2019.  She does understand   limitations of surgery and addressing her pelvic pain, dyspareunia, and   occasional overactive bladder symptoms, which have been addressed and she does   state that she understands these symptoms may be unimproved or actually   worsened with the surgery as described above requiring additional medical or   surgical management, even understanding limitations of surgery, she is wishing   to proceed forward the scheduled surgery as planned on 2019.    PAST MEDICAL HISTORY:  Generalized seizure disorder, migraine headaches,   anemia, lumbago, otherwise as stated above.    PAST  SURGICAL HISTORY:  She denies.    OBSTETRICAL HISTORY:  Patient has had macrosomic infants with largest infant   10 pounds 11 ounces in  and .  She had 1 therapeutic .  She   did have somewhat traumatic deliveries given the macrosomic infants and has   had significant complaints of prolapse symptoms in addition to urinary   incontinence since her last delivery, which she has managed with pelvic floor   exercise therapy and conservative management.    GYNECOLOGIC HISTORY:  The patient does report a large bulge at the vaginal   introitus consistent with the cervix and anterior vaginal wall mostly being   the leading edges.  She does report urinary incontinence requiring daily pad   therapy with overactive bladder symptoms.  She also describes occasional   dyspareunia, which she attributes to her symptomatic pelvic floor relaxation.    She also reports recurrent urinary tract infections, which she attributes to   her symptomatic pelvic floor relaxation.  She denies any previous sexually   transmitted diseases or pelvic infections.    FAMILY HISTORY:  Noncontributory.    REVIEW OF SYSTEMS:  Otherwise unremarkable.    SOCIAL HISTORY:  She is single.  She denies use of any alcohol.  She does   report smoking approximately a pack of cigarettes per day.  She currently   denies any other drug use.    MEDICATIONS:  Keppra for her epilepsy, which she takes 500 mg tablets twice   daily.    ALLERGIES:  TO PENICILLIN.    PHYSICAL EXAMINATION:  VITAL SIGNS:  She is afebrile, hemodynamically stable.  Please see the vital   signs on the electronic medical record for current results.  HEART:  Regular rate and rhythm.  CHEST:  Clear to auscultation bilaterally.  ABDOMEN:  Soft, nondistended, nontender.  Bowel sounds are present.  PELVIC:  Shows normal external female genitalia.  A grade II anterior,   posterior and uterine relaxation with tenderness to palpation at the uterine   fundus.  No adnexal mass or tenderness  to palpation were appreciated.  EXTREMITIES:  Nontender.    LABORATORY DATA:  Urine pregnancy test was negative on 2019.  Urinalysis   was within normal limits.  Urodynamic studies did demonstrate and confirmed   type 2 stress urinary incontinence.  Endometrial biopsy, Pap smear were within   normal limits showing no evidence of hyperplasia or malignancy.  Her pelvic   ultrasound was unremarkable.  Urodynamic studies did demonstrate and confirmed   type 2 stress urine incontinence.    ASSESSMENT AND PLAN:  A 38-year-old woman  3, para 2 with a history of   macrosomic traumatic deliveries with persistent prolapse and urinary   incontinence thereafter, stating that she has pelvic pain, dyspareunia, and   mixed urinary incontinence symptoms, now wishing to proceed forward with the   surgery as described above even understanding limitations of surgery.  Risks,   benefits, alternatives have been addressed.  She has asked appropriate   questions, signed the appropriate consents, and wished to proceed forward the   scheduled surgery as planned on 2019.       ____________________________________     MD SCOOTER RICKETTS / DAYSI    DD:  2019 08:32:35  DT:  2019 08:52:05    D#:  [id number]  Job#:  [id number]

## 2019-02-20 DIAGNOSIS — Z01.812 PRE-OPERATIVE LABORATORY EXAMINATION: ICD-10-CM

## 2019-02-20 LAB
ALBUMIN SERPL BCP-MCNC: 4 G/DL (ref 3.2–4.9)
ALBUMIN/GLOB SERPL: 1.7 G/DL
ALP SERPL-CCNC: 40 U/L (ref 30–99)
ALT SERPL-CCNC: 38 U/L (ref 2–50)
ANION GAP SERPL CALC-SCNC: 7 MMOL/L (ref 0–11.9)
AST SERPL-CCNC: 28 U/L (ref 12–45)
BILIRUB SERPL-MCNC: 0.3 MG/DL (ref 0.1–1.5)
BUN SERPL-MCNC: 7 MG/DL (ref 8–22)
CALCIUM SERPL-MCNC: 9.1 MG/DL (ref 8.5–10.5)
CHLORIDE SERPL-SCNC: 110 MMOL/L (ref 96–112)
CO2 SERPL-SCNC: 24 MMOL/L (ref 20–33)
CREAT SERPL-MCNC: 0.64 MG/DL (ref 0.5–1.4)
ERYTHROCYTE [DISTWIDTH] IN BLOOD BY AUTOMATED COUNT: 44.9 FL (ref 35.9–50)
GLOBULIN SER CALC-MCNC: 2.3 G/DL (ref 1.9–3.5)
GLUCOSE SERPL-MCNC: 96 MG/DL (ref 65–99)
HCT VFR BLD AUTO: 42.4 % (ref 37–47)
HGB BLD-MCNC: 14.2 G/DL (ref 12–16)
MCH RBC QN AUTO: 32.1 PG (ref 27–33)
MCHC RBC AUTO-ENTMCNC: 33.5 G/DL (ref 33.6–35)
MCV RBC AUTO: 95.9 FL (ref 81.4–97.8)
PLATELET # BLD AUTO: 202 K/UL (ref 164–446)
PMV BLD AUTO: 10.9 FL (ref 9–12.9)
POTASSIUM SERPL-SCNC: 4.2 MMOL/L (ref 3.6–5.5)
PROT SERPL-MCNC: 6.3 G/DL (ref 6–8.2)
RBC # BLD AUTO: 4.42 M/UL (ref 4.2–5.4)
SODIUM SERPL-SCNC: 141 MMOL/L (ref 135–145)
WBC # BLD AUTO: 7 K/UL (ref 4.8–10.8)

## 2019-02-20 PROCEDURE — 80053 COMPREHEN METABOLIC PANEL: CPT

## 2019-02-20 PROCEDURE — 36415 COLL VENOUS BLD VENIPUNCTURE: CPT

## 2019-02-20 PROCEDURE — 85027 COMPLETE CBC AUTOMATED: CPT

## 2019-02-26 ENCOUNTER — HOSPITAL ENCOUNTER (INPATIENT)
Facility: MEDICAL CENTER | Age: 39
LOS: 1 days | DRG: 743 | End: 2019-02-26
Attending: SPECIALIST | Admitting: SPECIALIST
Payer: MEDICAID

## 2019-02-26 VITALS
TEMPERATURE: 97.2 F | WEIGHT: 176.37 LBS | RESPIRATION RATE: 16 BRPM | HEART RATE: 78 BPM | OXYGEN SATURATION: 94 % | HEIGHT: 66 IN | BODY MASS INDEX: 28.34 KG/M2

## 2019-02-26 LAB
HCG SERPL QL: NEGATIVE
PATHOLOGY CONSULT NOTE: NORMAL

## 2019-02-26 PROCEDURE — 501516 HCHG SUTURE, CAPIO: Performed by: SPECIALIST

## 2019-02-26 PROCEDURE — 700111 HCHG RX REV CODE 636 W/ 250 OVERRIDE (IP)

## 2019-02-26 PROCEDURE — 0UT9FZZ RESECTION OF UTERUS, VIA NATURAL OR ARTIFICIAL OPENING WITH PERCUTANEOUS ENDOSCOPIC ASSISTANCE: ICD-10-PCS | Performed by: SPECIALIST

## 2019-02-26 PROCEDURE — 501330 HCHG SET, CYSTO IRRIG TUBING: Performed by: SPECIALIST

## 2019-02-26 PROCEDURE — 0UQF0ZZ REPAIR CUL-DE-SAC, OPEN APPROACH: ICD-10-PCS | Performed by: SPECIALIST

## 2019-02-26 PROCEDURE — 160046 HCHG PACU - 1ST 60 MINS PHASE II: Performed by: SPECIALIST

## 2019-02-26 PROCEDURE — 160009 HCHG ANES TIME/MIN: Performed by: SPECIALIST

## 2019-02-26 PROCEDURE — C1771 REP DEV, URINARY, W/SLING: HCPCS | Performed by: SPECIALIST

## 2019-02-26 PROCEDURE — 0JQC0ZZ REPAIR PELVIC REGION SUBCUTANEOUS TISSUE AND FASCIA, OPEN APPROACH: ICD-10-PCS | Performed by: SPECIALIST

## 2019-02-26 PROCEDURE — 501838 HCHG SUTURE GENERAL: Performed by: SPECIALIST

## 2019-02-26 PROCEDURE — 160048 HCHG OR STATISTICAL LEVEL 1-5: Performed by: SPECIALIST

## 2019-02-26 PROCEDURE — 501579 HCHG TROCAR, STEP 5MM: Performed by: SPECIALIST

## 2019-02-26 PROCEDURE — 306637 HCHG MISC ORTHO ITEM RC 0274

## 2019-02-26 PROCEDURE — A4338 INDWELLING CATHETER LATEX: HCPCS | Performed by: SPECIALIST

## 2019-02-26 PROCEDURE — 500854 HCHG NEEDLE, INSUFFLATION FOR STEP: Performed by: SPECIALIST

## 2019-02-26 PROCEDURE — 160002 HCHG RECOVERY MINUTES (STAT): Performed by: SPECIALIST

## 2019-02-26 PROCEDURE — 0USG0ZZ REPOSITION VAGINA, OPEN APPROACH: ICD-10-PCS | Performed by: SPECIALIST

## 2019-02-26 PROCEDURE — 160035 HCHG PACU - 1ST 60 MINS PHASE I: Performed by: SPECIALIST

## 2019-02-26 PROCEDURE — 500892 HCHG PACK, PERI-GYN: Performed by: SPECIALIST

## 2019-02-26 PROCEDURE — 160025 RECOVERY II MINUTES (STATS): Performed by: SPECIALIST

## 2019-02-26 PROCEDURE — 0UT7FZZ RESECTION OF BILATERAL FALLOPIAN TUBES, VIA NATURAL OR ARTIFICIAL OPENING WITH PERCUTANEOUS ENDOSCOPIC ASSISTANCE: ICD-10-PCS | Performed by: SPECIALIST

## 2019-02-26 PROCEDURE — 700101 HCHG RX REV CODE 250

## 2019-02-26 PROCEDURE — 502240 HCHG MISC OR SUPPLY RC 0272: Performed by: SPECIALIST

## 2019-02-26 PROCEDURE — 160047 HCHG PACU  - EA ADDL 30 MINS PHASE II: Performed by: SPECIALIST

## 2019-02-26 PROCEDURE — 160041 HCHG SURGERY MINUTES - EA ADDL 1 MIN LEVEL 4: Performed by: SPECIALIST

## 2019-02-26 PROCEDURE — 88307 TISSUE EXAM BY PATHOLOGIST: CPT

## 2019-02-26 PROCEDURE — 160029 HCHG SURGERY MINUTES - 1ST 30 MINS LEVEL 4: Performed by: SPECIALIST

## 2019-02-26 PROCEDURE — 501577 HCHG TROCAR, STEP 11MM: Performed by: SPECIALIST

## 2019-02-26 PROCEDURE — 0TJB8ZZ INSPECTION OF BLADDER, VIA NATURAL OR ARTIFICIAL OPENING ENDOSCOPIC: ICD-10-PCS | Performed by: SPECIALIST

## 2019-02-26 PROCEDURE — A9270 NON-COVERED ITEM OR SERVICE: HCPCS

## 2019-02-26 PROCEDURE — 502703 HCHG DEVICE, LIGASURE V SEALER: Performed by: SPECIALIST

## 2019-02-26 PROCEDURE — 500886 HCHG PACK, LAPAROSCOPY: Performed by: SPECIALIST

## 2019-02-26 PROCEDURE — 700102 HCHG RX REV CODE 250 W/ 637 OVERRIDE(OP)

## 2019-02-26 PROCEDURE — 84703 CHORIONIC GONADOTROPIN ASSAY: CPT

## 2019-02-26 DEVICE — SLING TOT OBTRYX I HALO: Type: IMPLANTABLE DEVICE | Status: FUNCTIONAL

## 2019-02-26 RX ORDER — BUPIVACAINE HYDROCHLORIDE AND EPINEPHRINE 2.5; 5 MG/ML; UG/ML
INJECTION, SOLUTION INFILTRATION; PERINEURAL
Status: DISCONTINUED | OUTPATIENT
Start: 2019-02-26 | End: 2019-02-26 | Stop reason: HOSPADM

## 2019-02-26 RX ORDER — OXYCODONE HYDROCHLORIDE AND ACETAMINOPHEN 5; 325 MG/1; MG/1
2 TABLET ORAL
Status: COMPLETED | OUTPATIENT
Start: 2019-02-26 | End: 2019-02-26

## 2019-02-26 RX ORDER — OXYCODONE HYDROCHLORIDE AND ACETAMINOPHEN 5; 325 MG/1; MG/1
1 TABLET ORAL
Status: COMPLETED | OUTPATIENT
Start: 2019-02-26 | End: 2019-02-26

## 2019-02-26 RX ORDER — BUPIVACAINE HYDROCHLORIDE AND EPINEPHRINE 2.5; 5 MG/ML; UG/ML
INJECTION, SOLUTION EPIDURAL; INFILTRATION; INTRACAUDAL; PERINEURAL
Status: DISCONTINUED
Start: 2019-02-26 | End: 2019-02-26 | Stop reason: HOSPADM

## 2019-02-26 RX ORDER — SIMETHICONE 80 MG
80 TABLET,CHEWABLE ORAL EVERY 8 HOURS PRN
Status: DISCONTINUED | OUTPATIENT
Start: 2019-02-26 | End: 2019-02-26 | Stop reason: HOSPADM

## 2019-02-26 RX ORDER — PROMETHAZINE HYDROCHLORIDE 25 MG/1
12.5 SUPPOSITORY RECTAL EVERY 4 HOURS PRN
Status: DISCONTINUED | OUTPATIENT
Start: 2019-02-26 | End: 2019-02-26 | Stop reason: HOSPADM

## 2019-02-26 RX ORDER — ONDANSETRON 2 MG/ML
4 INJECTION INTRAMUSCULAR; INTRAVENOUS
Status: DISCONTINUED | OUTPATIENT
Start: 2019-02-26 | End: 2019-02-26 | Stop reason: HOSPADM

## 2019-02-26 RX ORDER — HALOPERIDOL 5 MG/ML
1 INJECTION INTRAMUSCULAR
Status: DISCONTINUED | OUTPATIENT
Start: 2019-02-26 | End: 2019-02-26 | Stop reason: HOSPADM

## 2019-02-26 RX ORDER — BUPIVACAINE HYDROCHLORIDE AND EPINEPHRINE 2.5; 5 MG/ML; UG/ML
INJECTION, SOLUTION EPIDURAL; INFILTRATION; INTRACAUDAL; PERINEURAL
Status: DISCONTINUED | OUTPATIENT
Start: 2019-02-26 | End: 2019-02-26 | Stop reason: HOSPADM

## 2019-02-26 RX ORDER — DIPHENHYDRAMINE HYDROCHLORIDE 50 MG/ML
12.5 INJECTION INTRAMUSCULAR; INTRAVENOUS
Status: DISCONTINUED | OUTPATIENT
Start: 2019-02-26 | End: 2019-02-26 | Stop reason: HOSPADM

## 2019-02-26 RX ADMIN — FENTANYL CITRATE 50 MCG: 50 INJECTION, SOLUTION INTRAMUSCULAR; INTRAVENOUS at 11:42

## 2019-02-26 RX ADMIN — OXYCODONE AND ACETAMINOPHEN 2 TABLET: 5; 325 TABLET ORAL at 10:44

## 2019-02-26 RX ADMIN — FENTANYL CITRATE 50 MCG: 50 INJECTION, SOLUTION INTRAMUSCULAR; INTRAVENOUS at 10:00

## 2019-02-26 NOTE — OP REPORT
DATE OF SERVICE:  2/26/2019     PREOPERATIVE DIAGNOSES:  Near complete uterovaginal prolapse, pelvic pain,   dyspareunia, mixed urinary incontinence with a strong type 2 stress urinary   incontinence component as demonstrated and confirmed on urodynamic studies.     POSTOPERATIVE DIAGNOSES:  Same; final pathology pending     FINAL PATHOLOGY:  Pending.     PROCEDURES PERFORMED:  Procedure(s):  VAGINAL HYSTERECTOMY SCOPE TOTAL - Wound Class: Clean Contaminated  SALPINGECTOMY - Wound Class: Clean  ANTERIOR AND POSTERIOR REPAIR - Wound Class: Clean Contaminated  ENTEROCELE REPAIR-PERINEOPLASTY - Wound Class: Clean Contaminated  BLADDER SLING FEMALE- TOT - Wound Class: Clean Contaminated  VAGINAL SUSPENSION- SACROSPINOUS VAULT - Wound Class: Clean Contaminated     SURGEON:  Diego Orellana MD.     ASSISTANT:  Chester Bower MD.     ANESTHESIA:  General     ANESTHESIOLOGIST:  Anesthesiologist: Hiren Maria M.D.     ESTIMATED BLOOD LOSS FOR THE PROCEDURE:  100 mL.     FINDINGS:  Small uterus with normal appearing adnexa and pelvis.  Good support of the anterior and posterior vaginal walls in addition to apical vaginal tissue without any undue tension in the    suture sites.  Cystoscopy revealed normal urinary bladder, bilateral ureteral    efflux, and no undue tension noted at the level of the mid urethra after a    sling placement on cystoscopic evaluation.     DESCRIPTION OF PROCEDURE:  The patient was taken to the operating room, where  general anesthesia was performed without difficulty.  The patient was then    prepped and draped in usual sterile fashion with lower extremities placed in    Nick stirrups.  Attention was first turned to the perineum, where a weighted    speculum was placed with the use of Grant retractor.  Single-toothed tenaculum    was placed on the anterior lip of the cervix.  Hulka uterine manipulator was    then placed.  Single-toothed tenaculum and retractors were then removed. Attention was  then   turned to the umbilicus, where a 1 cm incision was made.  A Veress needle was   placed, 3.5 L of carboperitoneum were then obtained.  A 10 mm trocar port was   then placed.  Two separate ports were placed approximately one-third of the    way from the anterior-superior iliac spine lateral to the rectus muscle under    direct laparoscopic visualization.  Attention was then turned to the distal    portion of the fallopian tubes, which were grasped just below the fallopian    tube above the ovary.  This area was grasped, cauterized, and transected on    each side.  The mesosalpinx underneath the fallopian tube was then grasped,    cauterized, and transected all the way to the level of the uterus, freeing up    the fallopian tube on each side.  The uteroovarian, broad, and round ligaments   were individually isolated, cauterized, and transected.  The vesicouterine    peritoneum was grasped, incised, and the bladder flap was created.  Uterine    vessels were then clamped, cauterized, and transected  on each side.  The    vesicouterine peritoneum was then completed.  Attention was then turned to the   perineum, where a weighted speculum was placed with the use of Grant    retractor.  A thyroid tenaculum was placed on the anterior lip, one on the    posterior lips of the cervix.  Cervix was then injected with 10 mL of 0.25%    Marcaine with epinephrine.  Incision was made starting anteriorly, proceeding    posterior, proceeding back anterior once again.  With the use of Bovie    electrocautery, after injecting 10 mL of 0.25% Marcaine with epinephrine, the    anterior cul-de-sac was entered sharply.  Right angle Jennifer retractor was    placed upon sharp entry in the anterior cul-de-sac.  Large duck billed    speculum was placed upon sharp entry in the posterior cul-de-sac.  Uterosacral   cardinal complexes were then grasped with ZED clamps, transected, and suture    ligated with 0 Vicryl suture bilaterally.  Attention  was then turned to the    perineum, where the uterus and both fallopian tubes were then handed off the    field as specimen.  Excellent hemostasis noted at all vascular pedicles.  The    anterior and posterior leaf of the peritoneum in addition to the uterosacral    cardinal complexes were reapproximated in the midline with a pursestring    suture using 2-0 Vicryl.  The vaginal cuff was then reapproximated with    figure-of-eight sutures using 0 Vicryl reapproximating both uterosacral    cardinal complex and respective vaginal apices.  The anterior vaginal mucosa    was then injected with 10 mL of 0.25% Marcaine with epinephrine.  The vaginal    mucosa was then dissected off the underlying pubocervical fascia alaina until    the levator muscles were then reached.  Horizontal mattress sutures were then    used to reapproximate the pubocervical fascia in midline in a double 0    closure, thereby reducing the midline cystocele.  A 10 mL of 0.25% Marcaine    with epinephrine were injected with 5 mL on the right and 5 mL the left    lateral to the clitoris in the labial crural fold followed by stab incision    made with the use of #11 blade scalpel followed by placement of Obtryx halo    needles through the labial crural incision sites through the obturator    membrane out through the vaginal mucosal incision at the level of the mid    urethra.  The sling was then applied to the respective Obtryx halo needles and   it was taken back out through their original track.  Tensioning and position    was then performed.  Aguiar catheter was removed, which had been placed at the    beginning of the case for placement of a 30-degree cystoscope, which revealed    normal urinary bladder,  bilateral ureteral efflux, and no undue tension noted   at the level of the mid urethra.  The sling had been released under direct    cystoscopic evaluation.  Tensioning position was then finalized.  Cystoscope    removed.  Aguiar catheter replaced.   All excess vaginal mucosa and sling    material were then excised.  The vaginal mucosa was then reapproximated with    2-0 Vicryl in a running interlocking fashion in one segment.  Posterior    vaginal mucosa was then injected with 10 mL of 0.25% Marcaine with    epinephrine.  The vaginal mucosa was then dissected off the underlying    rectovaginal fascia alaina until the levator muscles were then reached.     Dissection of the sacrospinous process and ischial spine was then performed on   the right, 3 cm medial along the sacrospinous ligament with straight    catheterization needle device, 0 Ethibond suture was taken through the    sacrospinous ligament and taken out through the right apical portion of the    vaginal cuff on the underlying vaginal mucosa.  The rectovaginal septum was    then reapproximated in the posterior aspect of the vaginal cuff and a double    pursestring suture, thereby reducing a large enterocele located at the    superior aspect of the dissection.  Horizontal mattress suture was then used    to reapproximate the rectovaginal fascia in the midline in a double 0 closure,   thereby reducing the midline rectocele.  Excess vaginal mucosa was then    trimmed.  The vaginal mucosa was then reapproximated with 2-0 Vicryl in a    running interlocking fashion in one segment for a perineoplasty on the    perineum.  Attention was then turned back to the abdomen and pelvis.  Pelvis    was inspected and noted to be hemostatic, 3.5 L of carboperitoneum removed    followed by removal of the ports under direct laparoscopic visualization.  The   incisions were then reapproximated with single interrupted sutures using 4-0    Vicryl, injected with 20 mL of 0.25% Marcaine with epinephrine.  The patient    tolerated the procedure well and was awoken from general anesthesia, was taken   to the recovery room in stable condition.        ____________________________________     SUKI SPAIN MD

## 2019-02-26 NOTE — DISCHARGE INSTRUCTIONS
ACTIVITY: Rest and take it easy for the first 24 hours.  A responsible adult is recommended to remain with you during that time.  It is normal to feel sleepy.  We encourage you to not do anything that requires balance, judgment or coordination.    MILD FLU-LIKE SYMPTOMS ARE NORMAL. YOU MAY EXPERIENCE GENERALIZED MUSCLE ACHES, THROAT IRRITATION, HEADACHE AND/OR SOME NAUSEA.    FOR 24 HOURS DO NOT:  Drive, operate machinery or run household appliances.  Drink beer or alcoholic beverages.   Make important decisions or sign legal documents.    SPECIAL INSTRUCTIONS:     Keep Aguiar Cath in place overnight and call office in the morning to have removed.  See handouts for post op care.    DIET: To avoid nausea, slowly advance diet as tolerated, avoiding spicy or greasy foods for the first day.  Add more substantial food to your diet according to your physician's instructions.  Babies can be fed formula or breast milk as soon as they are hungry.  INCREASE FLUIDS AND FIBER TO AVOID CONSTIPATION.    SURGICAL DRESSING/BATHING:  Ok to shower in 24 hours.  Pat incisions dry once out.  NO SWIMMING or sitting in pools or tubs until approved by your physician.    FOLLOW-UP APPOINTMENT:  A follow-up appointment should be arranged with your doctor in as already scheduled.    You should CALL YOUR PHYSICIAN if you develop:  Fever greater than 101 degrees F.  Pain not relieved by medication, or persistent nausea or vomiting.  Excessive bleeding (blood soaking through dressing) or unexpected drainage from the wound.  Extreme redness or swelling around the incision site, drainage of pus or foul smelling drainage.  Inability to urinate or empty your bladder within 8 hours.  Problems with breathing or chest pain.    You should call 911 if you develop problems with breathing or chest pain.  If you are unable to contact your doctor or surgical center, you should go to the nearest emergency room or urgent care center.    Physician's telephone  #:  Dr. Orellana [phone]    If any questions arise, call your doctor.  If your doctor is not available, please feel free to call the Surgical Center at [phone].  The Center is open Monday through Friday from 7AM to 7PM.  You can also call the HEALTH HOTLINE open 24 hours/day, 7 days/week and speak to a nurse at [phone], or toll free at [phone].    A registered nurse may call you a few days after your surgery to see how you are doing after your procedure.    MEDICATIONS: Resume taking daily medication.  Take prescribed pain medication with food.  If no medication is prescribed, you may take non-aspirin pain medication if needed.  PAIN MEDICATION CAN BE VERY CONSTIPATING.  Take a stool softener or laxative such as senokot, pericolace, or milk of magnesia if needed.    Prescription given for Rx's given at preop appt.  Last pain medication given at 10:45 am; next dose due at 2:45 pm.    If your physician has prescribed pain medication that includes Acetaminophen (Tylenol), do not take additional Acetaminophen (Tylenol) while taking the prescribed medication.    Depression / Suicide Risk    As you are discharged from this [facility] facility, it is important to learn how to keep safe from harming yourself.    Recognize the warning signs:  · Abrupt changes in personality, positive or negative- including increase in energy   · Giving away possessions  · Change in eating patterns- significant weight changes-  positive or negative  · Change in sleeping patterns- unable to sleep or sleeping all the time   · Unwillingness or inability to communicate  · Depression  · Unusual sadness, discouragement and loneliness  · Talk of wanting to die  · Neglect of personal appearance   · Rebelliousness- reckless behavior  · Withdrawal from people/activities they love  · Confusion- inability to concentrate     If you or a loved one observes any of these behaviors or has concerns about self-harm, here's what you can  do:  · Talk about it- your feelings and reasons for harming yourself  · Remove any means that you might use to hurt yourself (examples: pills, rope, extension cords, firearm)  · Get professional help from the community (Mental Health, Substance Abuse, psychological counseling)  · Do not be alone:Call your Safe Contact- someone whom you trust who will be there for you.  · Call your local CRISIS HOTLINE 209-6255 or 316-586-2240  · Call your local Children's Mobile Crisis Response Team [place] (780) 519-0298 or www.Cuedd  · Call the toll free National Suicide Prevention Hotlines   · National Suicide Prevention Lifeline 379-277-MCBM (8482)  · National Hope Line Network 800-SUICIDE (793-8777)

## 2019-02-26 NOTE — OR SURGEON
Immediate Post OP Note    PreOp Diagnosis: Near complete uterovaginal prolapse, pelvic pain,   dyspareunia, mixed urinary incontinence with a strong type 2 stress urinary   incontinence component as demonstrated and confirmed on urodynamic studies.    PostOp Diagnosis: Same; final pathology pending    Procedure(s):  VAGINAL HYSTERECTOMY SCOPE TOTAL - Wound Class: Clean Contaminated  SALPINGECTOMY - Wound Class: Clean  ANTERIOR AND POSTERIOR REPAIR - Wound Class: Clean Contaminated  ENTEROCELE REPAIR-PERINEOPLASTY - Wound Class: Clean Contaminated  BLADDER SLING FEMALE- TOT - Wound Class: Clean Contaminated  VAGINAL SUSPENSION- SACROSPINOUS VAULT - Wound Class: Clean Contaminated    Surgeon(s):  JACKIE Burden M.D.    Anesthesiologist/Type of Anesthesia:  Anesthesiologist: Hiren Maria M.D./General    Surgical Staff:  Circulator: Nikki Simpson R.N.  Relief Circulator: Debi Negrete R.N.  Scrub Person: Shawnee Ledesma    Specimens removed if any:  ID Type Source Tests Collected by Time Destination   A : Cervix, Uterus, Bilateral Fallopian Tubes Tissue Uterus PATHOLOGY SPECIMEN Diego Orellana M.D. 2/26/2019  7:54 AM        Estimated Blood Loss: 100ccs    Findings: Small uterus with normal appearing adnexa and pelvis, good support of the anterior and posterior and apical vaginal tissue without any undue tension at any suture sites, cystoscopy revealed bilateral ureteral efflux, normal bladder and no undue tension at the mid urethra after sling placement.    Complications: None        2/26/2019 9:01 AM Diego Orellana M.D.

## 2019-02-26 NOTE — OR NURSING
0910 Received pt from OR, received report from Dr. Maria. Pt sleeping, VS WNL. Pt has 3 lap incisions to abdomen, bandaids in place, all CDI.     1006  Pt sleeping comfortably, no distress noted.  VSS.    1045 Pt medicated with fent and oxy for pain 6/10 to abdomen.     1142 Pt medicated with fent for pain 6/10 to abdomen.    1200 Pt titrated to RA, pt reports pain has improved.     1215 Report to Ronel TUBBS.

## 2019-02-26 NOTE — OR NURSING
1215 Rec'd report from SULY Castillo. Assumed care of pt.     1220 Pt dressed self.     1230 Pt's IV removed. Pt started to feel nauseated. Pt resting in bed, cool wash cloth placed to forehead for comfort. Quese Ease given to pt.     1238 Pt expressed readiness for d/c. Discussed d/c instructions with pt and pt's spouse. Answered all questions. Pt verbalized understanding. Pt taken via wheelchair by CNA accompanied by SO.     no

## 2019-10-25 NOTE — H&P
DATE OF SCHEDULED SURGERY: 2019    REASON FOR SURGERY:  Symptomatic pelvic floor relaxation, pelvic pain,   dyspareunia, mixed urinary incontinence with strong type 2 stress urinary   incontinence components noted on urodynamic studies.    HISTORY OF PRESENT ILLNESS:  A 38-year-old  3, para 2, therapeutic    1, status post a previous hysterectomy with pelvic floor repair who   states that she was initially doing well until she began having significant   complaints of a bulge at the vaginal introitus.  She does have a history of   constipation bearing down as a possible source of her recurrent pelvic floor   relaxation.  She has proceeded forward with pelvic floor exercise therapy for   which she has now failed.  She declines pessary therapy and is now interested   in proceeding forward with a native tissue repair in the form of an anterior   and posterior vaginal repair, enterocele repair, perineoplasty, sacrospinous   vault suspension, and transobturator tape mid-urethral sling procedure.    Risks, benefits and alternatives have been addressed.  She has asked   appropriate questions, signed the appropriate consents, and is wishing to   proceed forward with the scheduled surgery as planned.  She understands her   pelvic pain, dyspareunia, and overactive bladder symptoms may be unimproved or   actually worsen with time requiring additional medical or surgical   management, and even despite these limitations and her previous failure, she   is now interested in proceeding forward with the surgery as described above.    PAST MEDICAL HISTORY:  Generalized seizure disorder, migraine headaches,   anemia, lumbago, otherwise as stated above.    PAST SURGICAL HISTORY:  As above.    OBSTETRICAL HISTORY:  The patient has had macrosomic infants, largest infant,   10 pounds 11 ounces, between  and .  She has had one therapeutic   .  She did have somewhat traumatic deliveries given her  macrosomic   infants and has had significant complaints of pelvic floor relaxation more   recently after a previous surgery with worsening urinary incontinence   requiring daily pad therapy.    GYNECOLOGIC HISTORY:  She does report a large bulge at the vaginal introitus   consistent with the apex of the vaginal vault and anterior vaginal wall.  She   reports dyspareunia, pelvic pain, recurrent urinary tract infections, mixed   urinary incontinence, strong type 2 stress urinary incontinence component.    She denies any previous sexually transmitted diseases or pelvic infections.    FAMILY HISTORY:  Noncontributory.    REVIEW OF SYSTEMS:  Otherwise unremarkable.    SOCIAL HISTORY:  She is single.  She denies use of any alcohol.  She reports   smoking approximately a pack of cigarettes per day.  She currently denies any   other drug use.    MEDICATIONS:  Keppra for epilepsy, which she takes 500 mg twice daily.    ALLERGIES:  No known drug allergies.    PHYSICAL EXAMINATION  GENERAL:  She is afebrile, hemodynamically stable.  VITAL SIGNS:  Please see current vital signs in electronic medical record.  HEART:  Regular rate and rhythm.  CHEST:  Clear to auscultation bilaterally.  ABDOMEN:  Soft, nondistended, nontender.  Bowel sounds were present.  PELVIC:  Exam shows normal external female genitalia; a grade II anterior,   posterior and apical vaginal relaxation with tenderness to palpation at the   cervical cuff.  No adnexal masses were appreciated on bimanual examination.    LABORATORY DATA:  Urodynamic studies did demonstrate and confirm type 2 stress   urinary incontinence.  Pelvic ultrasound was unremarkable.    ASSESSMENT AND PLAN:  A 38-year-old woman  3, para 2 with a history of   macrosomic traumatic vaginal deliveries, now with recurrent prolapse, pelvic   pain, dyspareunia, mixed urinary incontinence, and a strong type 2 stress   urinary incontinence component as noted on urodynamic studies who has  failed   conservative management, declines pessary therapy, and is now wishing to   proceed forward with the surgery as described above on 11/07/2019   understanding limitations of the surgery.       ____________________________________     MD SCOOTER RICKETTS / DAYSI    DD:  10/24/2019 22:08:40  DT:  10/24/2019 22:29:49    D#:  [id number]  Job#:  [id number]

## 2019-11-07 ENCOUNTER — ANESTHESIA (OUTPATIENT)
Dept: SURGERY | Facility: MEDICAL CENTER | Age: 39
End: 2019-11-07
Payer: MEDICAID

## 2019-11-07 ENCOUNTER — HOSPITAL ENCOUNTER (OUTPATIENT)
Facility: MEDICAL CENTER | Age: 39
End: 2019-11-07
Attending: SPECIALIST | Admitting: SPECIALIST
Payer: MEDICAID

## 2019-11-07 ENCOUNTER — ANESTHESIA EVENT (OUTPATIENT)
Dept: SURGERY | Facility: MEDICAL CENTER | Age: 39
End: 2019-11-07
Payer: MEDICAID

## 2019-11-07 VITALS
WEIGHT: 162.26 LBS | BODY MASS INDEX: 26.08 KG/M2 | SYSTOLIC BLOOD PRESSURE: 117 MMHG | HEIGHT: 66 IN | OXYGEN SATURATION: 97 % | HEART RATE: 78 BPM | DIASTOLIC BLOOD PRESSURE: 68 MMHG | RESPIRATION RATE: 16 BRPM | TEMPERATURE: 97 F

## 2019-11-07 PROCEDURE — 700101 HCHG RX REV CODE 250

## 2019-11-07 PROCEDURE — 500892 HCHG PACK, PERI-GYN: Performed by: SPECIALIST

## 2019-11-07 PROCEDURE — 502240 HCHG MISC OR SUPPLY RC 0272: Performed by: SPECIALIST

## 2019-11-07 PROCEDURE — 160035 HCHG PACU - 1ST 60 MINS PHASE I: Performed by: SPECIALIST

## 2019-11-07 PROCEDURE — C1771 REP DEV, URINARY, W/SLING: HCPCS | Performed by: SPECIALIST

## 2019-11-07 PROCEDURE — 700111 HCHG RX REV CODE 636 W/ 250 OVERRIDE (IP)

## 2019-11-07 PROCEDURE — 160009 HCHG ANES TIME/MIN: Performed by: SPECIALIST

## 2019-11-07 PROCEDURE — A4338 INDWELLING CATHETER LATEX: HCPCS | Performed by: SPECIALIST

## 2019-11-07 PROCEDURE — 160048 HCHG OR STATISTICAL LEVEL 1-5: Performed by: SPECIALIST

## 2019-11-07 PROCEDURE — 501516 HCHG SUTURE, CAPIO: Performed by: SPECIALIST

## 2019-11-07 PROCEDURE — 501838 HCHG SUTURE GENERAL: Performed by: SPECIALIST

## 2019-11-07 PROCEDURE — 160047 HCHG PACU  - EA ADDL 30 MINS PHASE II: Performed by: SPECIALIST

## 2019-11-07 PROCEDURE — 501330 HCHG SET, CYSTO IRRIG TUBING: Performed by: SPECIALIST

## 2019-11-07 PROCEDURE — 160041 HCHG SURGERY MINUTES - EA ADDL 1 MIN LEVEL 4: Performed by: SPECIALIST

## 2019-11-07 PROCEDURE — 700105 HCHG RX REV CODE 258: Performed by: SPECIALIST

## 2019-11-07 PROCEDURE — 160002 HCHG RECOVERY MINUTES (STAT): Performed by: SPECIALIST

## 2019-11-07 PROCEDURE — 700101 HCHG RX REV CODE 250: Performed by: SPECIALIST

## 2019-11-07 PROCEDURE — 160029 HCHG SURGERY MINUTES - 1ST 30 MINS LEVEL 4: Performed by: SPECIALIST

## 2019-11-07 PROCEDURE — 160025 RECOVERY II MINUTES (STATS): Performed by: SPECIALIST

## 2019-11-07 PROCEDURE — 160046 HCHG PACU - 1ST 60 MINS PHASE II: Performed by: SPECIALIST

## 2019-11-07 PROCEDURE — 700111 HCHG RX REV CODE 636 W/ 250 OVERRIDE (IP): Performed by: SPECIALIST

## 2019-11-07 DEVICE — SLING TOT OBTRYX I HALO: Type: IMPLANTABLE DEVICE | Status: FUNCTIONAL

## 2019-11-07 RX ORDER — PROMETHAZINE HYDROCHLORIDE 25 MG/1
12.5 SUPPOSITORY RECTAL EVERY 4 HOURS PRN
Status: DISCONTINUED | OUTPATIENT
Start: 2019-11-07 | End: 2019-11-07 | Stop reason: HOSPADM

## 2019-11-07 RX ORDER — GENTAMICIN SULFATE 40 MG/ML
INJECTION, SOLUTION INTRAMUSCULAR; INTRAVENOUS
Status: DISCONTINUED | OUTPATIENT
Start: 2019-11-07 | End: 2019-11-07 | Stop reason: HOSPADM

## 2019-11-07 RX ORDER — METOCLOPRAMIDE HYDROCHLORIDE 5 MG/ML
INJECTION INTRAMUSCULAR; INTRAVENOUS PRN
Status: DISCONTINUED | OUTPATIENT
Start: 2019-11-07 | End: 2019-11-07 | Stop reason: SURG

## 2019-11-07 RX ORDER — HALOPERIDOL 5 MG/ML
1 INJECTION INTRAMUSCULAR
Status: DISCONTINUED | OUTPATIENT
Start: 2019-11-07 | End: 2019-11-07 | Stop reason: HOSPADM

## 2019-11-07 RX ORDER — KETOROLAC TROMETHAMINE 30 MG/ML
INJECTION, SOLUTION INTRAMUSCULAR; INTRAVENOUS PRN
Status: DISCONTINUED | OUTPATIENT
Start: 2019-11-07 | End: 2019-11-07 | Stop reason: SURG

## 2019-11-07 RX ORDER — OXYCODONE HYDROCHLORIDE AND ACETAMINOPHEN 5; 325 MG/1; MG/1
1 TABLET ORAL
Status: DISCONTINUED | OUTPATIENT
Start: 2019-11-07 | End: 2019-11-07 | Stop reason: HOSPADM

## 2019-11-07 RX ORDER — SODIUM CHLORIDE, SODIUM LACTATE, POTASSIUM CHLORIDE, CALCIUM CHLORIDE 600; 310; 30; 20 MG/100ML; MG/100ML; MG/100ML; MG/100ML
INJECTION, SOLUTION INTRAVENOUS CONTINUOUS
Status: DISCONTINUED | OUTPATIENT
Start: 2019-11-07 | End: 2019-11-07 | Stop reason: HOSPADM

## 2019-11-07 RX ORDER — ONDANSETRON 2 MG/ML
INJECTION INTRAMUSCULAR; INTRAVENOUS PRN
Status: DISCONTINUED | OUTPATIENT
Start: 2019-11-07 | End: 2019-11-07 | Stop reason: SURG

## 2019-11-07 RX ORDER — CEFAZOLIN SODIUM 1 G/3ML
INJECTION, POWDER, FOR SOLUTION INTRAMUSCULAR; INTRAVENOUS PRN
Status: DISCONTINUED | OUTPATIENT
Start: 2019-11-07 | End: 2019-11-07 | Stop reason: SURG

## 2019-11-07 RX ORDER — SIMETHICONE 80 MG
80 TABLET,CHEWABLE ORAL EVERY 8 HOURS PRN
Status: DISCONTINUED | OUTPATIENT
Start: 2019-11-07 | End: 2019-11-07 | Stop reason: HOSPADM

## 2019-11-07 RX ORDER — OXYCODONE HYDROCHLORIDE AND ACETAMINOPHEN 5; 325 MG/1; MG/1
2 TABLET ORAL
Status: DISCONTINUED | OUTPATIENT
Start: 2019-11-07 | End: 2019-11-07 | Stop reason: HOSPADM

## 2019-11-07 RX ORDER — BUPIVACAINE HYDROCHLORIDE AND EPINEPHRINE 2.5; 5 MG/ML; UG/ML
INJECTION, SOLUTION EPIDURAL; INFILTRATION; INTRACAUDAL; PERINEURAL
Status: DISCONTINUED | OUTPATIENT
Start: 2019-11-07 | End: 2019-11-07 | Stop reason: HOSPADM

## 2019-11-07 RX ORDER — DEXAMETHASONE SODIUM PHOSPHATE 4 MG/ML
INJECTION, SOLUTION INTRA-ARTICULAR; INTRALESIONAL; INTRAMUSCULAR; INTRAVENOUS; SOFT TISSUE PRN
Status: DISCONTINUED | OUTPATIENT
Start: 2019-11-07 | End: 2019-11-07 | Stop reason: SURG

## 2019-11-07 RX ORDER — ONDANSETRON 2 MG/ML
4 INJECTION INTRAMUSCULAR; INTRAVENOUS
Status: DISCONTINUED | OUTPATIENT
Start: 2019-11-07 | End: 2019-11-07 | Stop reason: HOSPADM

## 2019-11-07 RX ORDER — DIPHENHYDRAMINE HYDROCHLORIDE 50 MG/ML
12.5 INJECTION INTRAMUSCULAR; INTRAVENOUS
Status: DISCONTINUED | OUTPATIENT
Start: 2019-11-07 | End: 2019-11-07 | Stop reason: HOSPADM

## 2019-11-07 RX ORDER — MEPERIDINE HYDROCHLORIDE 25 MG/ML
INJECTION INTRAMUSCULAR; INTRAVENOUS; SUBCUTANEOUS PRN
Status: DISCONTINUED | OUTPATIENT
Start: 2019-11-07 | End: 2019-11-07 | Stop reason: SURG

## 2019-11-07 RX ADMIN — FENTANYL CITRATE 100 MCG: 50 INJECTION, SOLUTION INTRAMUSCULAR; INTRAVENOUS at 08:36

## 2019-11-07 RX ADMIN — SODIUM CHLORIDE, POTASSIUM CHLORIDE, SODIUM LACTATE AND CALCIUM CHLORIDE: 600; 310; 30; 20 INJECTION, SOLUTION INTRAVENOUS at 08:36

## 2019-11-07 RX ADMIN — METOCLOPRAMIDE 10 MG: 5 INJECTION, SOLUTION INTRAMUSCULAR; INTRAVENOUS at 08:36

## 2019-11-07 RX ADMIN — EPHEDRINE SULFATE 5 MG: 50 INJECTION, SOLUTION INTRAVENOUS at 08:56

## 2019-11-07 RX ADMIN — ONDANSETRON 8 MG: 2 INJECTION INTRAMUSCULAR; INTRAVENOUS at 08:36

## 2019-11-07 RX ADMIN — MEPERIDINE HYDROCHLORIDE 25 MG: 25 INJECTION INTRAMUSCULAR; INTRAVENOUS; SUBCUTANEOUS at 09:14

## 2019-11-07 RX ADMIN — KETOROLAC TROMETHAMINE 30 MG: 30 INJECTION, SOLUTION INTRAMUSCULAR at 08:36

## 2019-11-07 RX ADMIN — DEXAMETHASONE SODIUM PHOSPHATE 8 MG: 4 INJECTION, SOLUTION INTRA-ARTICULAR; INTRALESIONAL; INTRAMUSCULAR; INTRAVENOUS; SOFT TISSUE at 08:36

## 2019-11-07 RX ADMIN — CEFAZOLIN 2 G: 330 INJECTION, POWDER, FOR SOLUTION INTRAMUSCULAR; INTRAVENOUS at 08:35

## 2019-11-07 RX ADMIN — PROPOFOL 200 MG: 10 INJECTION, EMULSION INTRAVENOUS at 08:36

## 2019-11-07 NOTE — OR SURGEON
Immediate Post OP Note    PreOp Diagnosis: Symptomatic pelvic floor relaxation, pelvic pain,   dyspareunia, mixed urinary incontinence with strong type 2 stress urinary   incontinence components noted on urodynamic studies.    PostOp Diagnosis: Same     Procedure(s):  COLPORRHAPHY, COMBINED ANTEROPOSTERIOR - Wound Class: Clean Contaminated  REPAIR, ENTEROCELE- PERINEOPLASTY - Wound Class: Clean Contaminated  BLADDER SLING, FEMALE- TOT - Wound Class: Clean Contaminated  COLPOPEXY- SACROSPINOUS - Wound Class: Clean Contaminated    Surgeon(s):  JACKIE Burden M.D.    Anesthesiologist/Type of Anesthesia:  Anesthesiologist: Hiren Maria M.D./General    Surgical Staff:  Circulator: Sophia Patricia R.N.  Scrub Person: Maryann Castañeda    Specimens removed if any:  None    Estimated Blood Loss: Less than 30ccs    Findings: Good support of the anterior and the posterior and the apical vaginal tissue without any undue tension at any suture sites, cystoscopy revealed bilateral ureteral efflux, normal bladder and no undue tension at the mid urethra after sling placement.    Complications: None        11/7/2019 9:14 AM Diego Orellana M.D.

## 2019-11-07 NOTE — OP REPORT
DATE OF SERVICE:  11/7/2019     PREOPERATIVE DIAGNOSES:  Symptomatic pelvic floor relaxation, pelvic pain,   dyspareunia, mixed urinary incontinence with strong type 2 stress urinary   incontinence components noted on urodynamic studies.     POSTOPERATIVE DIAGNOSES: Same     PROCEDURES PERFORMED:  Anterior and posterior vaginal repair, enterocele    repair, sacrospinous vault suspension, transobturator tape midurethral sling    procedure, cystoscopy.     SURGEON:  Diego Orellana MD     ASSISTANT:  Chester Bower MD     ANESTHESIA:  General     ANESTHESIOLOGIST:  Anesthesiologist: Hiren Maria M.D.     ESTIMATED BLOOD LOSS FOR THE PROCEDURE:  Less than 30 mL.     FINDINGS:  Good support of the anterior and posterior vaginal walls in    addition to the apical vaginal tissue without any undue tension in the suture    sites.  Cystoscopy revealed bilateral ureteral efflux and normal bladder and    no undue tension noted at the level of the mid urethra after sling placement    on cystoscopic evaluation.     DESCRIPTION OF PROCEDURE:  The patient was taken to the operating room where    general anesthesia was performed without difficulty.  Patient was then prepped   and draped in usual sterile fashion.  Lower extremities placed in Nick    stirrups.  Attention was first turned to the perineum where a weighted    speculum was placed with the use of Grant retractor.  Anterior vaginal mucosa    was then injected with 10 mL of 0.25% Marcaine with epinephrine.  The vaginal    mucosa was then dissected off the underlying pubocervical fascia alaina until    the levator muscles were then reached.  Her previous transobturator tape sling   was resected followed by placement of horizontal mattress sutures    reapproximating the pubocervical fascia in midline in a double layer closure,    thereby reducing the midline cystocele followed by injection of 10 mL of 0.25%   Marcaine with epinephrine lateral to the clitoris in the labial crural  folds    followed by stab incision made with the use of 11 blade scalpel in this    location on each side followed by placement of the respective Obtryx halo    needle through the labial crural incision sites to the obturator membranes    through the endopelvic fascia out through the vaginal mucosal incision at the    level of the mid urethra.  The sling was then applied to the Obtryx halo    needle.  Needle was then taken back up through the original tract.  Tensioning   of position was then performed.  Aguiar catheter was removed, which had been    placed at the beginning of the case for placement of a 30-degree cystoscope,    which revealed normal urinary bladder, bilateral ureteral efflux and no undue    tension noted at the level of the mid urethra.  The sling was then released,    tensioning of position was then finalized under direct cystoscopic evaluation.    Cystoscope removed.  Aguiar catheter replaced.  All excess vaginal mucosa and   sling material were then excised.  The vaginal mucosa was then reapproximated   with 2-0 Vicryl in a running interlocking fashion in one segment.  Posterior    vaginal mucosa was then injected with 10 mL of 0.25% Marcaine with    epinephrine.  The vaginal mucosa was then dissected off the underlying    rectovaginal fascia alaina until the levator muscles were then reached.     Horizontal mattress sutures were then used to reapproximate the rectovaginal    fascia in the midline in a double 0 closure, thereby reducing the midline    rectocele.  A large enterocele was located at the superior aspect of the    dissection.  Dissection of the sacrospinous process was then performed in the    ischial spine, 3 cm medial along the sacrospinous ligament with straight    catheterization needle device, 0 Ethibond suture was taken through the    sacrospinous ligament taken out through the right apical portion of the    vaginal cuff and the overlying vaginal mucosa.  Once tied down, there was  good   reapproximation of the apex of the vaginal vault to the sacrospinous    ligament.  The rectovaginal septum was then reapproximated in the posterior    aspect of the vaginal cuff in a double pursestring suture, thereby reducing    the large enterocele located at the superior aspect of the dissection.  All    excess vaginal mucosa was then trimmed.  The vaginal mucosa was then    reapproximated with 2-0 Vicryl in running locking fashion in one segment    performing a perineoplasty on the perineum.  The patient tolerated procedure    well and was awoken from general anesthesia, was taken to the recovery in    stable condition.        ____________________________________     SUKI SPAIN MD

## 2019-11-07 NOTE — ANESTHESIA PREPROCEDURE EVALUATION
Relevant Problems   NEURO   (+) History of epilepsy       Physical Exam    Airway   Mallampati: II  TM distance: >3 FB  Neck ROM: full       Cardiovascular - normal exam  Rhythm: regular  Rate: normal  (-) murmur     Dental - normal exam           Pulmonary - normal exam  Breath sounds clear to auscultation     Abdominal    Neurological - normal exam                 Anesthesia Plan    ASA 2       Plan - general       Airway plan will be LMA        Induction: intravenous    Postoperative Plan: Postoperative administration of opioids is intended.    Pertinent diagnostic labs and testing reviewed    Informed Consent:    Anesthetic plan and risks discussed with patient.    Use of blood products discussed with: patient whom consented to blood products.

## 2019-11-07 NOTE — OR NURSING
0921-Pt in PACU from OR, report from Dr. Maria and OR RN.  Pt awake but drowsy. VSS. Aguiar catheter in place. Pt denies pain.     0938-Pt denies pain/nausea, VSS.     0950-Pt in PACU 2, waiting for ride home.     1044-Pt getting dressed, feels ready to go home. Minimal drainage present on veronique pad.     1100-DC instructions reviewed with pt and spouse.     1102-Pt discharged home via wheelchair.

## 2019-11-07 NOTE — ANESTHESIA TIME REPORT
Anesthesia Start and Stop Event Times     Date Time Event    11/7/2019 0830 Ready for Procedure     0835 Anesthesia Start     0923 Anesthesia Stop        Responsible Staff  11/07/19    Name Role Begin End    Hiren Maria M.D. Anesth 0843 0901        Preop Diagnosis (Free Text):  Pre-op Diagnosis     PELVIC PAIN, STRESS URINARY INCONTINENCE, CYSTOCELE, RECTOCELE        Preop Diagnosis (Codes):    Post op Diagnosis  Stress incontinence      Premium Reason  Non-Premium    Comments:

## 2019-11-07 NOTE — ANESTHESIA PROCEDURE NOTES
Airway  Date/Time: 11/7/2019 8:37 AM  Performed by: Hiren Maria M.D.  Authorized by: Hiren Maria M.D.     Location:  OR  Urgency:  Elective  Indications for Airway Management:  Anesthesia  Spontaneous Ventilation: absent    Sedation Level:  Deep  Preoxygenated: Yes    Final Airway Type:  Supraglottic airway  Final Supraglottic Airway:  Standard LMA  SGA Size:  4  Number of Attempts at Approach:  1

## 2019-11-07 NOTE — DISCHARGE INSTRUCTIONS
ACTIVITY: Rest and take it easy for the first 24 hours.  A responsible adult is recommended to remain with you during that time.  It is normal to feel sleepy.  We encourage you to not do anything that requires balance, judgment or coordination.    MILD FLU-LIKE SYMPTOMS ARE NORMAL. YOU MAY EXPERIENCE GENERALIZED MUSCLE ACHES, THROAT IRRITATION, HEADACHE AND/OR SOME NAUSEA.    FOR 24 HOURS DO NOT:  Drive, operate machinery or run household appliances.  Drink beer or alcoholic beverages.   Make important decisions or sign legal documents.    SPECIAL INSTRUCTIONS: Follow up in Dr. Orellana's office tomorrow AM to remove chaudhari catheter. Please see attachment for catheter instructions.     DIET: To avoid nausea, slowly advance diet as tolerated, avoiding spicy or greasy foods for the first day.  Add more substantial food to your diet according to your physician's instructions.  Babies can be fed formula or breast milk as soon as they are hungry.  INCREASE FLUIDS AND FIBER TO AVOID CONSTIPATION.    SURGICAL DRESSING/BATHING: OK to shower, no bath tubs.     FOLLOW-UP APPOINTMENT:  A follow-up appointment should be arranged with your doctor; call to schedule.    You should CALL YOUR PHYSICIAN if you develop:  Fever greater than 101 degrees F.  Pain not relieved by medication, or persistent nausea or vomiting.  Excessive bleeding (blood soaking through dressing) or unexpected drainage from the wound.  Extreme redness or swelling around the incision site, drainage of pus or foul smelling drainage.  Inability to urinate or empty your bladder within 8 hours.  Problems with breathing or chest pain.    You should call 911 if you develop problems with breathing or chest pain.  If you are unable to contact your doctor or surgical center, you should go to the nearest emergency room or urgent care center.  Physician's telephone #: Dr. ORELLANA [phone].    If any questions arise, call your doctor.  If your doctor is not available,  please feel free to call the Surgical Center at [phone].  The Center is open Monday through Friday from 7AM to 7PM.  You can also call the HEALTH HOTLINE open 24 hours/day, 7 days/week and speak to a nurse at [phone], or toll free at [phone].    A registered nurse may call you a few days after your surgery to see how you are doing after your procedure.    MEDICATIONS: Resume taking daily medication.  Take prescribed pain medication with food.  If no medication is prescribed, you may take non-aspirin pain medication if needed.  PAIN MEDICATION CAN BE VERY CONSTIPATING.  Take a stool softener or laxative such as senokot, pericolace, or milk of magnesia if needed.    Prescription given for N/A.  Last pain medication given at N/A.    If your physician has prescribed pain medication that includes Acetaminophen (Tylenol), do not take additional Acetaminophen (Tylenol) while taking the prescribed medication.    Depression / Suicide Risk    As you are discharged from this [facility] facility, it is important to learn how to keep safe from harming yourself.    Recognize the warning signs:  · Abrupt changes in personality, positive or negative- including increase in energy   · Giving away possessions  · Change in eating patterns- significant weight changes-  positive or negative  · Change in sleeping patterns- unable to sleep or sleeping all the time   · Unwillingness or inability to communicate  · Depression  · Unusual sadness, discouragement and loneliness  · Talk of wanting to die  · Neglect of personal appearance   · Rebelliousness- reckless behavior  · Withdrawal from people/activities they love  · Confusion- inability to concentrate     If you or a loved one observes any of these behaviors or has concerns about self-harm, here's what you can do:  · Talk about it- your feelings and reasons for harming yourself  · Remove any means that you might use to hurt yourself (examples: pills, rope, extension  cords, firearm)  · Get professional help from the community (Mental Health, Substance Abuse, psychological counseling)  · Do not be alone:Call your Safe Contact- someone whom you trust who will be there for you.  · Call your local CRISIS HOTLINE 015-4495 or 331-136-6375  · Call your local Children's Mobile Crisis Response Team [place] (125) 560-0545 or www.[employer]  · Call the toll free National Suicide Prevention Hotlines   · National Suicide Prevention Lifeline 397-289-YUWF (6888)  · National Hope Line Network 800-SUICIDE (191-1536)

## 2019-11-07 NOTE — ANESTHESIA POSTPROCEDURE EVALUATION
Patient: Roxana Johnson    Procedure Summary     Date:  11/07/19 Room / Location:  [facility] ROOM 23 / SURGERY SAME DAY [facility]    Anesthesia Start:  0835 Anesthesia Stop:  0923    Procedures:       COLPORRHAPHY, COMBINED ANTEROPOSTERIOR (Vagina )      REPAIR, ENTEROCELE- PERINEOPLASTY (Vagina )      BLADDER SLING, FEMALE- TOT (Vagina )      COLPOPEXY- SACROSPINOUS (Vagina ) Diagnosis:  (PELVIC PAIN, STRESS URINARY INCONTINENCE, CYSTOCELE, RECTOCELE)    Surgeon:  Diego Orellana M.D. Responsible Provider:  Hiren Maria M.D.    Anesthesia Type:  general ASA Status:  2          Final Anesthesia Type: general  Last vitals  BP   Blood Pressure: 122/79    Temp   36.1 °C (97 °F)    Pulse   Pulse: 90   Resp   12    SpO2   100 %      Anesthesia Post Evaluation    Patient location during evaluation: PACU  Patient participation: complete - patient participated  Level of consciousness: awake and alert    Airway patency: patent  Anesthetic complications: no  Cardiovascular status: hemodynamically stable  Respiratory status: acceptable  Hydration status: euvolemic    PONV: none           Nurse Pain Score: 0 (NPRS)

## 2021-06-09 NOTE — PROGRESS NOTES
New Patient    Patient: Roxana Johnson  : 1980    Referring Physician:Jessica De La Torre A.P.R* Tilda K. Martin, A.P.R.N.    CC: possible seizures      IMPRESSION:    1. Seizure type and frequency of seizures- remote history of nocturnal convulsion and history of presumed emotional focal seizure aware  Last  Seizure 6 years ago.    2. Etiology/Syndrome-unknown  3. EEG findings- normal   4. Brain imaging MRI brain reported to be normal but no image or report   5. Antiepileptic drug side effects and counseling done  Current AED: none   6. Surgery consideration vns done   7. Safety issues counseling done   8. Reproductive, contraception, pregnancy discussion done, advised compliance  Association with non-epileptic events cannot be ruled-out    PLAN:    1. History of epilepsy  - REFERRAL TO NEURODIAGNOSTICS (EEG,EP,EMG/NCS/DBS)    2. Seizure disorder (HCC)  - levETIRAcetam (KEPPRA) 500 MG Tab; Take 1 tablet by mouth 2 times a day.  Dispense: 120 tablet; Refill: 11     3. Advise to reduce triggering factors  4. Woman within reproductive age: s/p hysterectomy   5. Return in 3-6 months or sooner if needed.       HISTORY:    I had the opportunity to see Ms.  Roxana Johnson in the epilepsy clinic on 6/10/2021 for seizure disorder. she came alone. This is her initial visit. she is referred by JUDE Bolaños for possible seizure disorder. She was previously seen by Galina 2018.      Dominant hand: right handed   Outside records and ED record reviewed: Yes.    In brief, her pertinent medical history is remarkable for seizure disorder and anxiety.     Event type 1 - nocturnal convulsions     The onset of seizure was age 15, the ictal behavior was stereotyped and described as GTC. She had two GTCs at least. She was seen neurologist at that time and was started on AED trials   She stopped AED at age 19 after seizure free?  The last seizure was on age 16  The longest seizure free  "period was 25 years.     Event type 2-focal emotional     The onset of event was age 34 when she started to have episodes of palpation and was felt to be related to arrhythmia. A few months later she started to have spells of euphoria and thought she was going \"crazy\".  Still noticing heart palpitations infrequently. Had a strong sense of feeling happy or a strong hot flash  Duration 30 seconds , frequency: daily or every other day at the onset but none since LEV was started.   The last seizure was 6 years ago, shortly after she was started on LEV.   The longest seizure free period was 6 years.     She stopping taking LEV regularly for the past 2 years. She did not notice any recurrent seizure for the past 2 years that she is aware.     Seizure type 3- none    Special features:  They were noted only during the wake and sleep, there was no specific timing.     Potential triggering factors were reviewed   Sleep deprivation   Alcohol   Stress +   Periods   Other    Epilepsy risk factors:     -Positive: None  -Negative: Normal prenatal and  course. Normal development physically and intellectually. No febrile convulsions. No history of severe head trauma. No meningitis. No stroke. No alcohol abuse. No significant exposure to recreational drugs. There is no family history of epilepsy, or spells.   2 years of [employer].Working full-time nights and [employer].    Current antiepileptic medicines: LEV 1000 mg bid in 2018 but she stopped LEV 2 years ago.     Previous workup:    1. EEG data: normal EEG 2017 (RA)    2. Neuroimaging data: MRI brain reported to be normal but no image or report     3. Recent AED level:    Prior antiepileptic medications were reviewed  PHT: side effect  VPA bleeding issue  CBZ: side effect  LEV    Antiepileptic medications most useful: LEV     Other therapeutic interventions:   Vagus nerve stimulation   Diet   Surgery for epilepsy   Other    Current Outpatient Medications " "  Medication Sig Dispense Refill   • levETIRAcetam (KEPPRA) 500 MG Tab Take 1 tablet by mouth 2 times a day. 120 tablet 11   • omeprazole (PRILOSEC) 20 MG delayed-release capsule Take 1 capsule by mouth every day. (Patient not taking: Reported on 6/10/2021) 30 capsule 1     No current facility-administered medications for this visit.        Allergies   Allergen Reactions   • Pcn [Penicillins] Itching     Rash itching       Past Medical History:   Diagnosis Date   • Arrhythmia 02/20/2019    \"benign\"   • Bowel habit changes     constipation and dliarrhea   • Dental disorder     left upper crown fell out 10/31/1919   • Hepatitis C     tested positive for antibodies, but \"no\"   • Psychiatric problem     depression and anxiety   • Seizure (HCC) 02/20/2019    epilepsy, last seizure 4 years ago, diagnosed age 16   • Urinary incontinence        Social History     Socioeconomic History   • Marital status: Single     Spouse name: Not on file   • Number of children: Not on file   • Years of education: Not on file   • Highest education level: Not on file   Occupational History   • Not on file   Tobacco Use   • Smoking status: Current Every Day Smoker     Packs/day: 1.00     Years: 20.00     Pack years: 20.00     Types: Cigarettes   • Smokeless tobacco: Never Used   Vaping Use   • Vaping Use: Never used   Substance and Sexual Activity   • Alcohol use: Not Currently   • Drug use: Yes     Types: Inhaled     Comment: marijuana   • Sexual activity: Not on file   Other Topics Concern   • Not on file   Social History Narrative   • Not on file     Social Determinants of Health     Financial Resource Strain:    • Difficulty of Paying Living Expenses:    Food Insecurity:    • Worried About Running Out of Food in the Last Year:    • Ran Out of Food in the Last Year:    Transportation Needs:    • Lack of Transportation (Medical):    • Lack of Transportation (Non-Medical):    Physical Activity:    • Days of Exercise per Week:    • Minutes " "of Exercise per Session:    Stress:    • Feeling of Stress :    Social Connections:    • Frequency of Communication with Friends and Family:    • Frequency of Social Gatherings with Friends and Family:    • Attends [employer] Services:    • Active Member of Clubs or Organizations:    • Attends Club or Organization Meetings:    • Marital Status:    Intimate Partner Violence:    • Fear of Current or Ex-Partner:    • Emotionally Abused:    • Physically Abused:    • Sexually Abused:        Family History   Problem Relation Age of Onset   • Hypertension Mother    • Psychiatric Illness Mother    • Cancer Father    • Hypertension Sister          PHYSICAL EXAM:    /82 (BP Location: Right arm, Patient Position: Sitting, BP Cuff Size: Adult)   Pulse 96   Temp 36.6 °C (97.8 °F) (Temporal)   Resp 20   Ht 1.676 m (5' 5.98\")   Wt 77.1 kg (169 lb 15.6 oz)   LMP 02/04/2019 (Exact Date)   SpO2 96%   BMI 27.45 kg/m²     On examination,  She appears her stated age. She has a normal, reactive affect. No apparent distress,  alert and appropriate. No labored breathing.  There is no peripheral edema. Skin: no rash or abnormalities    She gives a precise account of  her clinical symptoms. She has fluent conversational speech, without error.Her face is symmetric.  I see no tremor. Gait is normal.         The total visit duration was of 61minutes of which more than 50% was spent in coordination of care and counseling.         Ta Jennings MD  Diplomate, American Board of Psychiatry and Neurology   Diplomate, American Board of Psychiatry and Neurology in Epilepsy        "

## 2021-06-10 ENCOUNTER — OFFICE VISIT (OUTPATIENT)
Dept: NEUROLOGY | Facility: MEDICAL CENTER | Age: 41
End: 2021-06-10
Attending: PSYCHIATRY & NEUROLOGY
Payer: MEDICAID

## 2021-06-10 VITALS
DIASTOLIC BLOOD PRESSURE: 82 MMHG | HEIGHT: 66 IN | OXYGEN SATURATION: 96 % | RESPIRATION RATE: 20 BRPM | TEMPERATURE: 97.8 F | BODY MASS INDEX: 27.32 KG/M2 | WEIGHT: 169.97 LBS | HEART RATE: 96 BPM | SYSTOLIC BLOOD PRESSURE: 142 MMHG

## 2021-06-10 DIAGNOSIS — Z86.69 HISTORY OF EPILEPSY: Primary | ICD-10-CM

## 2021-06-10 DIAGNOSIS — G40.909 SEIZURE DISORDER (HCC): ICD-10-CM

## 2021-06-10 PROCEDURE — 99211 OFF/OP EST MAY X REQ PHY/QHP: CPT | Performed by: PSYCHIATRY & NEUROLOGY

## 2021-06-10 PROCEDURE — 99205 OFFICE O/P NEW HI 60 MIN: CPT | Performed by: PSYCHIATRY & NEUROLOGY

## 2021-06-10 RX ORDER — LEVETIRACETAM 500 MG/1
500 TABLET ORAL 2 TIMES DAILY
Qty: 120 TABLET | Refills: 11 | Status: SHIPPED | OUTPATIENT
Start: 2021-06-10 | End: 2021-09-01

## 2021-06-10 ASSESSMENT — FIBROSIS 4 INDEX: FIB4 SCORE: 0.56

## 2021-06-10 NOTE — PATIENT INSTRUCTIONS
Seizure precautions    Driving    Every State restricts driving in people with seizures. [place] requires that you be seizure free for 3 months from the date of your last seizure. The Department of Motor Vehicles (DMV), not the doctor, makes the decision on driving. Exceptions may be made for seizures that do not affect mental condition and ability to control a car, or that occur 100% during sleep.. We recommend:  § Do not drive if you are having seizures that would be dangerous on the road.  § Be honest with your doctor about your seizures, even if driving may be at risk  § Be honest with the DMV (use the driving form). It may protect you legally if problems later occur.    Seizure medicines and suicide    Seizure medicines have long been known to help some people with depression, but also to make others worse. The FDA recently took a look at their database of clinical studies of people taking epilepsy medicines. Their finding was 4 suicides in 27,863 patients taking epilepsy medicines, versus none in patients taking placebo (an inactive pill). They reported 105 people of the 27,863 who did not commit suicide, but had thoughts of suicide. Combined, the risk for suicidal thoughts and behavior was about 0.4% (1 in 250) for those taking epilepsy medications and 0.2% (1 in 500) for those given placebos.    This is new and important information because doctors and patients need to know the possible side effects of medicines. But it needs to be put in perspective and certainly is no reason for panic. The 4 suicides in 27,863 is a very small percentage, and it is impossible to be sure the epilepsy drugs were the cause. Depression occurs in about 10% or more of people with epilepsy, even independent of medications. We recommend the following.  § Do not stop your seizure medicine. It could be dangerous.  § If you have symptoms of depression, such as crying and low mood, please discuss them at your clinic visits, so a  decision can be made about whether antidepressant medication or referral to a psychotherapist would be useful.  § If you are thinking seriously about suicide, please call 911.  § For most people, this FDA warning is just something to know, but not a reason to change medicines.    Bone health    Several of the older antiseizure medications may cause thinning of bones with long-term use, leading to broken bones later in life. This is most problematic with phenytoin (Dilantin), but may occur with carbamazepine (Tegretol, Carbatrol), phenobarbital, primidone (Mysoline) and possibly valproate/valproic acid (Depakote, Depakene). This is a larger concern for women in mid-life or older, but it also can be an issue for men and younger women.   § This potential problem is not an emergency and occurs over months to years; do not stop your seizure medication without discussing your concerns with your doctor.   § Calcium, vitamin D3 supplementation and regular exercise may be helpful to maintain bone health.  § Periodic bone density screening may be helpful to show existence or progression of bone thinning.     Water Safety    You could drown during a seizure that occurs in water. Use the [employer] system for swimming. Let the arthur know that you have seizures. Take showers instead of baths.    Burn safety    If you have uncontrolled seizures, be very careful around heat or flames. Cook on the back burner - you are less likely to lean on the burner or turn over the soup during a seizure. Don’t smoke, which is good advice for other reasons as well. Set the maximum house hot water temperature to 110 degrees Fahrenheit. Put guards on open fireplaces, wood stoves or radiators.     Heights    Occasional use of ladders and going up and down stairs is a reasonable risk. If your seizures are not in control, then do not work on ladders or unprotected heights for more than brief minutes.    Equipment and Power Tools    Cutting, chopping and  drilling equipment should have safety guards to avoid inadvertent injury; otherwise, do not use it if your seizures are not fully controlled. Do not use mowers lacking automatic stop switches or chain saws.     Safety    If you have uncontrolled seizures, do not carry your child in your arms, but use one of the slings/papooses. Change the baby on the floor. Do not bathe the baby in water deep enough for the mouth to be underwater. Breastfeeding is usually considered beneficial, even though small amounts of seizure medicines come out in the breast milk.    Fall Precautions      If you fall with some of your seizures, then fall-proof your environment. Put in carpets, cover sharp corners, and consider wearing a protective helmet in some circumstances.    Sudden Unexplained Death in Epilepsy (SUDEP)    It is rare for people to die from a seizure, but it can happen. One way is trauma or a car crash from a seizure. Another is the poorly understood condition called sudden unexplained death in epilepsy (SUDEP). We think this is most likely due to heart arrhythmias (irregular beats) caused by a seizure, but the mechanism is debated. For people with uncontrolled seizures we recommend:  § Do not suddenly stop your seizure medication, since this can be a risk factor for SUDEP.  § Do not be overly worried about SUDEP. It is tragic when it happens, but it is uncommon and there are currently no preventive measures, other than the best possible seizure control.    Medication Side Effects    To be approved for prescription use, seizure medicines must pass strict safety testing. Nevertheless, they all have side effects, some of which are potentially serious or even lethal. The risks of medications must be balanced against the risks of seizures. A full discussion of possible medicine side effects is not possible here, but we recommend:  § Know the main side effects of your seizure medicines. Your doctor is the best source  for individual information. Web sites such as epilepsyfoundation.org and epilepsy.com have good information.  § The package insert provided with your prescription lists full information on side effects, but most of these will never occur in an individual. Let the package insert inform you, but not scare you. Be aware that some side effects occur from drug interactions among all your medications. Interactions can involve prescription medicines, over-the-counter medicines, herbal remedies and even some foods. Grapefruit juice is an example of a seemingly benign food that can raise levels of carbamazepine or other drugs.  § Generic medications are less expensive, but may not produce the same blood levels as do brand name drugs or even other generics. Insurance plans and pharmacies sometimes switch to generics without patient or doctor approval. Be cautious if you are switching or being switched to generics. It may work out fine (and it often is a lot less expensive), but a blood test to check levels might be useful.    Recreation    If having a seizure during a recreational activity would cause you significant harm, then do not do the activity. Use common sense. Confer with your medical team for individual restrictions. As a general guideline for starting discussion with your medical team, we recommend:  § Low-risk recreation can be done by people with seizures, even if not in control. These include walking, running, bowling, golf, baseball, basketball, soccer, volleyball, swimming with the [employer] system, weight training with machines or spotters, elliptical trainers, treadmills with spotters. Confirm this with your medical care team.  § You should be able to go at least 3 months without a seizure to participate in medium-risk activities, but confirm this interval with your doctor. These include football, hockey, ice skating, bike racing, gymnastics, horseback riding and boating.  § You should be seizure free for more  than a year to perform high-risk activities, although some doctors recommend not engaging in high-risk recreational activities at all with a history of epilepsy. Ask yours if it is safe to engage in high-risk recreation. High-risk activities include hang gliding, motor sports, skiing, competitive skateboarding, mountain or rock climbing and SCUBA diving.

## 2021-09-01 PROBLEM — F17.210 CIGARETTE NICOTINE DEPENDENCE WITHOUT COMPLICATION: Status: ACTIVE | Noted: 2021-09-01

## 2024-01-08 PROBLEM — R30.0 DYSURIA: Status: RESOLVED | Noted: 2019-01-04 | Resolved: 2024-01-08

## 2024-01-08 PROBLEM — R82.90 ABNORMAL URINE ODOR: Status: RESOLVED | Noted: 2019-01-04 | Resolved: 2024-01-08

## 2024-01-08 PROBLEM — R50.9 FEVER: Status: RESOLVED | Noted: 2019-01-04 | Resolved: 2024-01-08

## 2024-01-08 PROBLEM — Z97.5 PRESENCE OF INTRAUTERINE CONTRACEPTIVE DEVICE: Status: RESOLVED | Noted: 2018-09-26 | Resolved: 2024-01-08

## 2024-01-08 PROBLEM — R11.0 NAUSEATED: Status: RESOLVED | Noted: 2019-01-04 | Resolved: 2024-01-08

## 2024-01-08 PROBLEM — Z13.29 SCREENING FOR THYROID DISORDER: Status: RESOLVED | Noted: 2019-01-04 | Resolved: 2024-01-08

## 2024-01-08 PROBLEM — R35.0 URINARY FREQUENCY: Status: RESOLVED | Noted: 2019-01-04 | Resolved: 2024-01-08

## 2024-01-08 PROBLEM — R31.9 HEMATURIA: Status: RESOLVED | Noted: 2019-01-04 | Resolved: 2024-01-08

## (undated) DEVICE — SENSOR SPO2 NEO LNCS ADHESIVE (20/BX) SEE USER NOTES

## (undated) DEVICE — CANISTER SUCTION 3000ML MECHANICAL FILTER AUTO SHUTOFF MEDI-VAC NONSTERILE LF DISP  (40EA/CA)

## (undated) DEVICE — PAD SANITARY 11IN MAXI IND WRAPPED  (12EA/PK 24PK/CA)

## (undated) DEVICE — SET IRRIGATION CYSTOSCOPY TUBE L80 IN (20EA/CA)

## (undated) DEVICE — LIGASURE LAPAROSCOPIC 5MM - (6EA/CA)

## (undated) DEVICE — SUTURE GENERAL

## (undated) DEVICE — TUBING CLEARLINK DUO-VENT - C-FLO (48EA/CA)

## (undated) DEVICE — TRAY SRGPRP PVP IOD WT PRP - (20/CA)

## (undated) DEVICE — BANDAID X-LARGE 2 X 4 IN LF (50EA/BX)

## (undated) DEVICE — HEAD HOLDER JUNIOR/ADULT

## (undated) DEVICE — NEPTUNE 4 PORT MANIFOLD - (20/PK)

## (undated) DEVICE — CANISTER SUCTION RIGID RED 1500CC (40EA/CA)

## (undated) DEVICE — TROCAR STEP 5MM - (3/CA)

## (undated) DEVICE — ARMREST CRADLE FOAM - (2PR/PK 12PR/CA)

## (undated) DEVICE — SET LEADWIRE 5 LEAD BEDSIDE DISPOSABLE ECG (1SET OF 5/EA)

## (undated) DEVICE — ELECTRODE DUAL RETURN W/ CORD - (50/PK)

## (undated) DEVICE — SODIUM CHL IRRIGATION 0.9% 1000ML (12EA/CA)

## (undated) DEVICE — KIT  I.V. START (100EA/CA)

## (undated) DEVICE — TRAY FOLEY CATHETER STATLOCK 16FR SURESTEP  (10EA/CA)

## (undated) DEVICE — SUCTION INSTRUMENT YANKAUER BULBOUS TIP W/O VENT (50EA/CA)

## (undated) DEVICE — CHLORAPREP 26 ML APPLICATOR - ORANGE TINT(25/CA)

## (undated) DEVICE — SUTURE 0 VICRYL PLUS CT-1 - 8 X 18 INCH (12/BX)

## (undated) DEVICE — CATHETER IV 20 GA X 1-1/4 ---SURG.& SDS ONLY--- (50EA/BX)

## (undated) DEVICE — GLOVE BIOGEL PI INDICATOR SZ 7.0 SURGICAL PF LF - (50/BX 4BX/CA)

## (undated) DEVICE — TUBE E-T HI-LO CUFF 6.5MM (10EA/BX)

## (undated) DEVICE — Device

## (undated) DEVICE — KIT ANESTHESIA W/CIRCUIT & 3/LT BAG W/FILTER (20EA/CA)

## (undated) DEVICE — ELECTRODE 850 FOAM ADHESIVE - HYDROGEL RADIOTRNSPRNT (50/PK)

## (undated) DEVICE — SUTURE 4-0 VICRYL PLUS FS-2 - 27 INCH (36/BX)

## (undated) DEVICE — BANDAID SHEER STRIP 3/4 IN (100EA/BX 12BX/CA)

## (undated) DEVICE — DRAPE VAGINAL BIB W/ POUCH (10EA/CA)

## (undated) DEVICE — BLADE SURGICAL #11 - (50/BX)

## (undated) DEVICE — DEVICE SUTURE CAPTURING

## (undated) DEVICE — PACK LAPAROSCOPY - (1/CA)

## (undated) DEVICE — GLOVESZ 8.5 BIOGEL PI MICRO - PF LF (50PR/BX)

## (undated) DEVICE — PACK TRENGUARD 450 PROCEDURE (12EA/CA)

## (undated) DEVICE — PROTECTOR ULNA NERVE - (36PR/CA)

## (undated) DEVICE — WATER IRRIGATION STERILE 1000ML (12EA/CA)

## (undated) DEVICE — LACTATED RINGERS INJ 1000 ML - (14EA/CA 60CA/PF)

## (undated) DEVICE — SET EXTENSION WITH 2 PORTS (48EA/CA) ***PART #2C8610 IS A SUBSTITUTE*****

## (undated) DEVICE — BLADE SURGICAL #15 - (50/BX 3BX/CA)

## (undated) DEVICE — GOWN SURGEONS X-LARGE - DISP. (30/CA)

## (undated) DEVICE — SUTURE CAPIO (12EA/BX)

## (undated) DEVICE — SET SUCTION/IRRIGATION WITH DISPOSABLE TIP (6/CA )PART #0250-070-520 IS A SUB

## (undated) DEVICE — SUTURE 2-0 VICRYL PLUS CT-1 36 (36PK/BX)"

## (undated) DEVICE — GLOVE BIOGEL SZ 7 SURGICAL PF LTX - (50PR/BX 4BX/CA)

## (undated) DEVICE — TUBE CONNECTING SUCTION - CLEAR PLASTIC STERILE 72 IN (50EA/CA)

## (undated) DEVICE — TROCAR STEP 11MM - (3/CA)

## (undated) DEVICE — GLOVE BIOGEL SZ 8 SURGICAL PF LTX - (50PR/BX 4BX/CA)

## (undated) DEVICE — NEEDLE INSUFFLATION FOR STEP - (12/BX)

## (undated) DEVICE — TUBING SETDISPOS HIGH FLOW II - (10/BX)

## (undated) DEVICE — MASK ANESTHESIA ADULT  - (100/CA)

## (undated) DEVICE — SYRINGE 10 ML CONTROL LL (25EA/BX 4BX/CA)

## (undated) DEVICE — PACK MINOR BASIN - (2EA/CA)